# Patient Record
Sex: FEMALE | Race: BLACK OR AFRICAN AMERICAN | NOT HISPANIC OR LATINO | URBAN - METROPOLITAN AREA
[De-identification: names, ages, dates, MRNs, and addresses within clinical notes are randomized per-mention and may not be internally consistent; named-entity substitution may affect disease eponyms.]

---

## 2017-03-19 ENCOUNTER — EMERGENCY (EMERGENCY)
Facility: HOSPITAL | Age: 52
LOS: 0 days | Discharge: HOME | End: 2017-03-19

## 2017-06-27 DIAGNOSIS — R19.7 DIARRHEA, UNSPECIFIED: ICD-10-CM

## 2017-06-27 DIAGNOSIS — E83.42 HYPOMAGNESEMIA: ICD-10-CM

## 2017-06-27 DIAGNOSIS — R11.10 VOMITING, UNSPECIFIED: ICD-10-CM

## 2017-06-27 DIAGNOSIS — E87.6 HYPOKALEMIA: ICD-10-CM

## 2017-06-27 DIAGNOSIS — R51 HEADACHE: ICD-10-CM

## 2017-06-27 DIAGNOSIS — E86.0 DEHYDRATION: ICD-10-CM

## 2018-09-22 ENCOUNTER — OUTPATIENT (OUTPATIENT)
Dept: OUTPATIENT SERVICES | Facility: HOSPITAL | Age: 53
LOS: 1 days | Discharge: HOME | End: 2018-09-22

## 2018-09-22 DIAGNOSIS — D64.9 ANEMIA, UNSPECIFIED: ICD-10-CM

## 2018-09-22 DIAGNOSIS — E78.2 MIXED HYPERLIPIDEMIA: ICD-10-CM

## 2018-09-22 DIAGNOSIS — R79.9 ABNORMAL FINDING OF BLOOD CHEMISTRY, UNSPECIFIED: ICD-10-CM

## 2018-09-22 DIAGNOSIS — E03.9 HYPOTHYROIDISM, UNSPECIFIED: ICD-10-CM

## 2018-09-22 DIAGNOSIS — E11.9 TYPE 2 DIABETES MELLITUS WITHOUT COMPLICATIONS: ICD-10-CM

## 2021-07-06 ENCOUNTER — INPATIENT (INPATIENT)
Facility: HOSPITAL | Age: 56
LOS: 1 days | Discharge: HOME | End: 2021-07-08
Attending: INTERNAL MEDICINE | Admitting: INTERNAL MEDICINE
Payer: COMMERCIAL

## 2021-07-06 VITALS
TEMPERATURE: 98 F | OXYGEN SATURATION: 100 % | HEIGHT: 65 IN | SYSTOLIC BLOOD PRESSURE: 109 MMHG | RESPIRATION RATE: 18 BRPM | WEIGHT: 145.06 LBS | DIASTOLIC BLOOD PRESSURE: 59 MMHG | HEART RATE: 88 BPM

## 2021-07-06 LAB
ALBUMIN SERPL ELPH-MCNC: 4 G/DL — SIGNIFICANT CHANGE UP (ref 3.5–5.2)
ALBUMIN SERPL ELPH-MCNC: 4.4 G/DL — SIGNIFICANT CHANGE UP (ref 3.5–5.2)
ALP SERPL-CCNC: 83 U/L — SIGNIFICANT CHANGE UP (ref 30–115)
ALP SERPL-CCNC: 95 U/L — SIGNIFICANT CHANGE UP (ref 30–115)
ALT FLD-CCNC: 17 U/L — SIGNIFICANT CHANGE UP (ref 0–41)
ALT FLD-CCNC: 18 U/L — SIGNIFICANT CHANGE UP (ref 0–41)
ANION GAP SERPL CALC-SCNC: 13 MMOL/L — SIGNIFICANT CHANGE UP (ref 7–14)
ANION GAP SERPL CALC-SCNC: 8 MMOL/L — SIGNIFICANT CHANGE UP (ref 7–14)
ANION GAP SERPL CALC-SCNC: 9 MMOL/L — SIGNIFICANT CHANGE UP (ref 7–14)
APTT BLD: 24.1 SEC — LOW (ref 27–39.2)
APTT BLD: 38.2 SEC — SIGNIFICANT CHANGE UP (ref 27–39.2)
AST SERPL-CCNC: 19 U/L — SIGNIFICANT CHANGE UP (ref 0–41)
AST SERPL-CCNC: 41 U/L — SIGNIFICANT CHANGE UP (ref 0–41)
BASOPHILS # BLD AUTO: 0.04 K/UL — SIGNIFICANT CHANGE UP (ref 0–0.2)
BASOPHILS # BLD AUTO: 0.06 K/UL — SIGNIFICANT CHANGE UP (ref 0–0.2)
BASOPHILS NFR BLD AUTO: 0.4 % — SIGNIFICANT CHANGE UP (ref 0–1)
BASOPHILS NFR BLD AUTO: 0.5 % — SIGNIFICANT CHANGE UP (ref 0–1)
BILIRUB SERPL-MCNC: 0.2 MG/DL — SIGNIFICANT CHANGE UP (ref 0.2–1.2)
BILIRUB SERPL-MCNC: 0.2 MG/DL — SIGNIFICANT CHANGE UP (ref 0.2–1.2)
BLD GP AB SCN SERPL QL: SIGNIFICANT CHANGE UP
BUN SERPL-MCNC: 11 MG/DL — SIGNIFICANT CHANGE UP (ref 10–20)
BUN SERPL-MCNC: 13 MG/DL — SIGNIFICANT CHANGE UP (ref 10–20)
BUN SERPL-MCNC: 16 MG/DL — SIGNIFICANT CHANGE UP (ref 10–20)
CALCIUM SERPL-MCNC: 8.5 MG/DL — SIGNIFICANT CHANGE UP (ref 8.5–10.1)
CALCIUM SERPL-MCNC: 8.5 MG/DL — SIGNIFICANT CHANGE UP (ref 8.5–10.1)
CALCIUM SERPL-MCNC: 9.2 MG/DL — SIGNIFICANT CHANGE UP (ref 8.5–10.1)
CHLORIDE SERPL-SCNC: 103 MMOL/L — SIGNIFICANT CHANGE UP (ref 98–110)
CHLORIDE SERPL-SCNC: 106 MMOL/L — SIGNIFICANT CHANGE UP (ref 98–110)
CHLORIDE SERPL-SCNC: 96 MMOL/L — LOW (ref 98–110)
CK MB CFR SERPL CALC: 44.1 NG/ML — HIGH (ref 0.6–6.3)
CK SERPL-CCNC: 562 U/L — HIGH (ref 0–225)
CO2 SERPL-SCNC: 29 MMOL/L — SIGNIFICANT CHANGE UP (ref 17–32)
CO2 SERPL-SCNC: 31 MMOL/L — SIGNIFICANT CHANGE UP (ref 17–32)
CO2 SERPL-SCNC: 31 MMOL/L — SIGNIFICANT CHANGE UP (ref 17–32)
CREAT SERPL-MCNC: 0.5 MG/DL — LOW (ref 0.7–1.5)
CREAT SERPL-MCNC: 0.5 MG/DL — LOW (ref 0.7–1.5)
CREAT SERPL-MCNC: 0.6 MG/DL — LOW (ref 0.7–1.5)
EOSINOPHIL # BLD AUTO: 0 K/UL — SIGNIFICANT CHANGE UP (ref 0–0.7)
EOSINOPHIL # BLD AUTO: 0 K/UL — SIGNIFICANT CHANGE UP (ref 0–0.7)
EOSINOPHIL NFR BLD AUTO: 0 % — SIGNIFICANT CHANGE UP (ref 0–8)
EOSINOPHIL NFR BLD AUTO: 0 % — SIGNIFICANT CHANGE UP (ref 0–8)
GLUCOSE SERPL-MCNC: 116 MG/DL — HIGH (ref 70–99)
GLUCOSE SERPL-MCNC: 132 MG/DL — HIGH (ref 70–99)
GLUCOSE SERPL-MCNC: 92 MG/DL — SIGNIFICANT CHANGE UP (ref 70–99)
HCG SERPL QL: NEGATIVE — SIGNIFICANT CHANGE UP
HCT VFR BLD CALC: 36.3 % — LOW (ref 37–47)
HCT VFR BLD CALC: 38.3 % — SIGNIFICANT CHANGE UP (ref 37–47)
HGB BLD-MCNC: 12 G/DL — SIGNIFICANT CHANGE UP (ref 12–16)
HGB BLD-MCNC: 12.7 G/DL — SIGNIFICANT CHANGE UP (ref 12–16)
IMM GRANULOCYTES NFR BLD AUTO: 0.2 % — SIGNIFICANT CHANGE UP (ref 0.1–0.3)
IMM GRANULOCYTES NFR BLD AUTO: 0.3 % — SIGNIFICANT CHANGE UP (ref 0.1–0.3)
INR BLD: 1.06 RATIO — SIGNIFICANT CHANGE UP (ref 0.65–1.3)
INR BLD: 1.09 RATIO — SIGNIFICANT CHANGE UP (ref 0.65–1.3)
LYMPHOCYTES # BLD AUTO: 1.57 K/UL — SIGNIFICANT CHANGE UP (ref 1.2–3.4)
LYMPHOCYTES # BLD AUTO: 16.3 % — LOW (ref 20.5–51.1)
LYMPHOCYTES # BLD AUTO: 18.1 % — LOW (ref 20.5–51.1)
LYMPHOCYTES # BLD AUTO: 2.06 K/UL — SIGNIFICANT CHANGE UP (ref 1.2–3.4)
MAGNESIUM SERPL-MCNC: 1.7 MG/DL — LOW (ref 1.8–2.4)
MAGNESIUM SERPL-MCNC: 2 MG/DL — SIGNIFICANT CHANGE UP (ref 1.8–2.4)
MAGNESIUM SERPL-MCNC: 2.2 MG/DL — SIGNIFICANT CHANGE UP (ref 1.8–2.4)
MCHC RBC-ENTMCNC: 27.7 PG — SIGNIFICANT CHANGE UP (ref 27–31)
MCHC RBC-ENTMCNC: 27.9 PG — SIGNIFICANT CHANGE UP (ref 27–31)
MCHC RBC-ENTMCNC: 33.1 G/DL — SIGNIFICANT CHANGE UP (ref 32–37)
MCHC RBC-ENTMCNC: 33.2 G/DL — SIGNIFICANT CHANGE UP (ref 32–37)
MCV RBC AUTO: 83.8 FL — SIGNIFICANT CHANGE UP (ref 81–99)
MCV RBC AUTO: 84 FL — SIGNIFICANT CHANGE UP (ref 81–99)
MONOCYTES # BLD AUTO: 0.47 K/UL — SIGNIFICANT CHANGE UP (ref 0.1–0.6)
MONOCYTES # BLD AUTO: 0.73 K/UL — HIGH (ref 0.1–0.6)
MONOCYTES NFR BLD AUTO: 4.9 % — SIGNIFICANT CHANGE UP (ref 1.7–9.3)
MONOCYTES NFR BLD AUTO: 6.4 % — SIGNIFICANT CHANGE UP (ref 1.7–9.3)
NEUTROPHILS # BLD AUTO: 7.51 K/UL — HIGH (ref 1.4–6.5)
NEUTROPHILS # BLD AUTO: 8.48 K/UL — HIGH (ref 1.4–6.5)
NEUTROPHILS NFR BLD AUTO: 74.8 % — SIGNIFICANT CHANGE UP (ref 42.2–75.2)
NEUTROPHILS NFR BLD AUTO: 78.1 % — HIGH (ref 42.2–75.2)
NRBC # BLD: 0 /100 WBCS — SIGNIFICANT CHANGE UP (ref 0–0)
NRBC # BLD: 0 /100 WBCS — SIGNIFICANT CHANGE UP (ref 0–0)
PHOSPHATE SERPL-MCNC: 3.2 MG/DL — SIGNIFICANT CHANGE UP (ref 2.1–4.9)
PLATELET # BLD AUTO: 237 K/UL — SIGNIFICANT CHANGE UP (ref 130–400)
PLATELET # BLD AUTO: 265 K/UL — SIGNIFICANT CHANGE UP (ref 130–400)
POTASSIUM SERPL-MCNC: 2.4 MMOL/L — CRITICAL LOW (ref 3.5–5)
POTASSIUM SERPL-MCNC: 3 MMOL/L — LOW (ref 3.5–5)
POTASSIUM SERPL-MCNC: 4.2 MMOL/L — SIGNIFICANT CHANGE UP (ref 3.5–5)
POTASSIUM SERPL-SCNC: 2.4 MMOL/L — CRITICAL LOW (ref 3.5–5)
POTASSIUM SERPL-SCNC: 3 MMOL/L — LOW (ref 3.5–5)
POTASSIUM SERPL-SCNC: 4.2 MMOL/L — SIGNIFICANT CHANGE UP (ref 3.5–5)
PROT SERPL-MCNC: 6.6 G/DL — SIGNIFICANT CHANGE UP (ref 6–8)
PROT SERPL-MCNC: 7.4 G/DL — SIGNIFICANT CHANGE UP (ref 6–8)
PROTHROM AB SERPL-ACNC: 12.2 SEC — SIGNIFICANT CHANGE UP (ref 9.95–12.87)
PROTHROM AB SERPL-ACNC: 12.5 SEC — SIGNIFICANT CHANGE UP (ref 9.95–12.87)
RBC # BLD: 4.33 M/UL — SIGNIFICANT CHANGE UP (ref 4.2–5.4)
RBC # BLD: 4.56 M/UL — SIGNIFICANT CHANGE UP (ref 4.2–5.4)
RBC # FLD: 14.4 % — SIGNIFICANT CHANGE UP (ref 11.5–14.5)
RBC # FLD: 14.6 % — HIGH (ref 11.5–14.5)
SARS-COV-2 RNA SPEC QL NAA+PROBE: SIGNIFICANT CHANGE UP
SODIUM SERPL-SCNC: 140 MMOL/L — SIGNIFICANT CHANGE UP (ref 135–146)
SODIUM SERPL-SCNC: 143 MMOL/L — SIGNIFICANT CHANGE UP (ref 135–146)
SODIUM SERPL-SCNC: 143 MMOL/L — SIGNIFICANT CHANGE UP (ref 135–146)
TROPONIN T SERPL-MCNC: 0.32 NG/ML — CRITICAL HIGH
TROPONIN T SERPL-MCNC: <0.01 NG/ML — SIGNIFICANT CHANGE UP
WBC # BLD: 11.35 K/UL — HIGH (ref 4.8–10.8)
WBC # BLD: 9.62 K/UL — SIGNIFICANT CHANGE UP (ref 4.8–10.8)
WBC # FLD AUTO: 11.35 K/UL — HIGH (ref 4.8–10.8)
WBC # FLD AUTO: 9.62 K/UL — SIGNIFICANT CHANGE UP (ref 4.8–10.8)

## 2021-07-06 PROCEDURE — 71045 X-RAY EXAM CHEST 1 VIEW: CPT | Mod: 26

## 2021-07-06 PROCEDURE — 99291 CRITICAL CARE FIRST HOUR: CPT

## 2021-07-06 PROCEDURE — 93306 TTE W/DOPPLER COMPLETE: CPT | Mod: 26

## 2021-07-06 PROCEDURE — 99233 SBSQ HOSP IP/OBS HIGH 50: CPT

## 2021-07-06 PROCEDURE — 93010 ELECTROCARDIOGRAM REPORT: CPT | Mod: 76

## 2021-07-06 RX ORDER — SODIUM CHLORIDE 9 MG/ML
1000 INJECTION INTRAMUSCULAR; INTRAVENOUS; SUBCUTANEOUS
Refills: 0 | Status: DISCONTINUED | OUTPATIENT
Start: 2021-07-06 | End: 2021-07-08

## 2021-07-06 RX ORDER — METOPROLOL TARTRATE 50 MG
12.5 TABLET ORAL EVERY 12 HOURS
Refills: 0 | Status: DISCONTINUED | OUTPATIENT
Start: 2021-07-06 | End: 2021-07-07

## 2021-07-06 RX ORDER — SODIUM CHLORIDE 9 MG/ML
1000 INJECTION INTRAMUSCULAR; INTRAVENOUS; SUBCUTANEOUS ONCE
Refills: 0 | Status: COMPLETED | OUTPATIENT
Start: 2021-07-06 | End: 2021-07-06

## 2021-07-06 RX ORDER — CLOPIDOGREL BISULFATE 75 MG/1
600 TABLET, FILM COATED ORAL ONCE
Refills: 0 | Status: COMPLETED | OUTPATIENT
Start: 2021-07-06 | End: 2021-07-06

## 2021-07-06 RX ORDER — HEPARIN SODIUM 5000 [USP'U]/ML
4100 INJECTION INTRAVENOUS; SUBCUTANEOUS ONCE
Refills: 0 | Status: DISCONTINUED | OUTPATIENT
Start: 2021-07-06 | End: 2021-07-06

## 2021-07-06 RX ORDER — PANTOPRAZOLE SODIUM 20 MG/1
40 TABLET, DELAYED RELEASE ORAL
Refills: 0 | Status: DISCONTINUED | OUTPATIENT
Start: 2021-07-06 | End: 2021-07-08

## 2021-07-06 RX ORDER — POTASSIUM CHLORIDE 20 MEQ
20 PACKET (EA) ORAL
Refills: 0 | Status: COMPLETED | OUTPATIENT
Start: 2021-07-06 | End: 2021-07-06

## 2021-07-06 RX ORDER — MAGNESIUM SULFATE 500 MG/ML
2 VIAL (ML) INJECTION ONCE
Refills: 0 | Status: COMPLETED | OUTPATIENT
Start: 2021-07-06 | End: 2021-07-06

## 2021-07-06 RX ORDER — HEPARIN SODIUM 5000 [USP'U]/ML
INJECTION INTRAVENOUS; SUBCUTANEOUS
Qty: 25000 | Refills: 0 | Status: DISCONTINUED | OUTPATIENT
Start: 2021-07-06 | End: 2021-07-06

## 2021-07-06 RX ORDER — CLOPIDOGREL BISULFATE 75 MG/1
75 TABLET, FILM COATED ORAL DAILY
Refills: 0 | Status: DISCONTINUED | OUTPATIENT
Start: 2021-07-06 | End: 2021-07-08

## 2021-07-06 RX ORDER — ONDANSETRON 8 MG/1
4 TABLET, FILM COATED ORAL ONCE
Refills: 0 | Status: COMPLETED | OUTPATIENT
Start: 2021-07-06 | End: 2021-07-06

## 2021-07-06 RX ORDER — ASPIRIN/CALCIUM CARB/MAGNESIUM 324 MG
324 TABLET ORAL ONCE
Refills: 0 | Status: COMPLETED | OUTPATIENT
Start: 2021-07-06 | End: 2021-07-06

## 2021-07-06 RX ORDER — POTASSIUM CHLORIDE 20 MEQ
40 PACKET (EA) ORAL EVERY 4 HOURS
Refills: 0 | Status: COMPLETED | OUTPATIENT
Start: 2021-07-06 | End: 2021-07-06

## 2021-07-06 RX ORDER — POTASSIUM CHLORIDE 20 MEQ
40 PACKET (EA) ORAL ONCE
Refills: 0 | Status: COMPLETED | OUTPATIENT
Start: 2021-07-06 | End: 2021-07-06

## 2021-07-06 RX ORDER — ASPIRIN/CALCIUM CARB/MAGNESIUM 324 MG
81 TABLET ORAL DAILY
Refills: 0 | Status: DISCONTINUED | OUTPATIENT
Start: 2021-07-06 | End: 2021-07-08

## 2021-07-06 RX ORDER — ATORVASTATIN CALCIUM 80 MG/1
80 TABLET, FILM COATED ORAL AT BEDTIME
Refills: 0 | Status: DISCONTINUED | OUTPATIENT
Start: 2021-07-06 | End: 2021-07-08

## 2021-07-06 RX ADMIN — Medication 324 MILLIGRAM(S): at 02:34

## 2021-07-06 RX ADMIN — Medication 81 MILLIGRAM(S): at 14:31

## 2021-07-06 RX ADMIN — Medication 50 GRAM(S): at 02:33

## 2021-07-06 RX ADMIN — SODIUM CHLORIDE 100 MILLILITER(S): 9 INJECTION INTRAMUSCULAR; INTRAVENOUS; SUBCUTANEOUS at 13:55

## 2021-07-06 RX ADMIN — CLOPIDOGREL BISULFATE 75 MILLIGRAM(S): 75 TABLET, FILM COATED ORAL at 14:31

## 2021-07-06 RX ADMIN — ONDANSETRON 4 MILLIGRAM(S): 8 TABLET, FILM COATED ORAL at 16:15

## 2021-07-06 RX ADMIN — HEPARIN SODIUM 800 UNIT(S)/HR: 5000 INJECTION INTRAVENOUS; SUBCUTANEOUS at 02:34

## 2021-07-06 RX ADMIN — Medication 40 MILLIEQUIVALENT(S): at 17:51

## 2021-07-06 RX ADMIN — SODIUM CHLORIDE 1000 MILLILITER(S): 9 INJECTION INTRAMUSCULAR; INTRAVENOUS; SUBCUTANEOUS at 02:32

## 2021-07-06 RX ADMIN — Medication 40 MILLIEQUIVALENT(S): at 02:34

## 2021-07-06 RX ADMIN — Medication 40 MILLIEQUIVALENT(S): at 05:51

## 2021-07-06 RX ADMIN — ATORVASTATIN CALCIUM 80 MILLIGRAM(S): 80 TABLET, FILM COATED ORAL at 21:56

## 2021-07-06 RX ADMIN — Medication 12.5 MILLIGRAM(S): at 17:51

## 2021-07-06 RX ADMIN — Medication 40 MILLIEQUIVALENT(S): at 14:30

## 2021-07-06 RX ADMIN — CLOPIDOGREL BISULFATE 600 MILLIGRAM(S): 75 TABLET, FILM COATED ORAL at 02:33

## 2021-07-06 NOTE — H&P ADULT - ASSESSMENT
IMPRESSION:  - Suspecting Inferior wall MI (no clear ST elevations on EKG; possible inferior wall hypokinesis on bedside echo)  - Hypokalemia (on chronic K supplementation at home)  - Hypomagnesemia     PLAN:    CNS: Avoid CNS Depressants     HEENT: Oral care. Aspiration precautions     PULMONARY: HOB @ 45. Monitor Pulse Ox. Keep > 93%. Supplement as needed.    CARDIOVASCULAR:   - Loaded in ASA and Plavix in the ED. ASA 81mg PO QD, Plavix 75mg PO QD from tomorrow. Lipitor 80mg PO QHS  - LHC in AM. Keep NPO  - Lipid profile. HbA1C. Echo  - Continue heparin gtt. Monitor PTT. Target PTT 55 - 88    GI: GI prophylaxis. NPO    RENAL: Avoid nephrotoxic agents. Monitor Cr. IV hydration. Received 20 mEq IV potassium and 40 mEq oral potassium in the ED. Follow repeat. Also received 2g Mg in the ED. Follow repeat in AM    INFECTIOUS DISEASE: Afebrile     HEMATOLOGICAL: Continue heparin gtt. Monitor PTT. Target PTT 55 - 88    ENDOCRINE: Monitor FS. Insulin protocol if needed. HbA1C in AM    MUSCULOSKELETAL: Bedrest     CODE STATUS: Full Code    DISPOSITION: Admit to CCU         57 YO F with PMHx of hypokalemia (on home supplementation), active smoker (1/2 PPD x 30 yrs), works as a  (denies any history of chest pain on exertion) presented to the ED with chest pain for 1 hour after she was under emotional stress     IMPRESSION:  - Suspected Inferior wall MI (no clear ST elevations on EKG; possible inferior wall hypokinesis on bedside echo)  - Active smoker (1/2 PPD x 30 years)  - Hypokalemia (on chronic K supplementation at home)  - Hypomagnesemia     PLAN:    CNS: Avoid CNS Depressants     HEENT: Oral care. Aspiration precautions     PULMONARY: HOB @ 45. Monitor Pulse Ox. Keep > 93%. Supplement as needed.    CARDIOVASCULAR:   - Loaded in ASA and Plavix in the ED. ASA 81mg PO QD, Plavix 75mg PO QD from tomorrow. Lipitor 80mg PO QHS  - LHC in AM. Keep NPO  - Lipid profile. HbA1C. Echo  - Continue heparin gtt. Monitor PTT. Target PTT 55 - 88    GI: GI prophylaxis. NPO    RENAL: Avoid nephrotoxic agents. Monitor Cr. IV hydration. Received 20 mEq IV potassium and 40 mEq oral potassium in the ED. Follow repeat. Also received 2g Mg in the ED. Follow repeat in AM    INFECTIOUS DISEASE: Afebrile     HEMATOLOGICAL: Continue heparin gtt. Monitor PTT. Target PTT 55 - 88    ENDOCRINE: Monitor FS. Insulin protocol if needed. HbA1C in AM    MUSCULOSKELETAL: Bedrest     CODE STATUS: Full Code    DISPOSITION: Admit to CCU         57 YO F with PMHx of hypokalemia (on home supplementation), active smoker (1/2 PPD x 30 yrs), works as a  (denies any history of chest pain on exertion) presented to the ED with chest pain for 1 hour after she was under emotional stress     IMPRESSION:  - Suspected Inferior wall MI (no clear ST elevations on EKG; possible inferior wall hypokinesis on bedside echo)  - Active smoker (1/2 PPD x 30 years)  - Hypokalemia (on chronic K supplementation at home)  - Hypomagnesemia     PLAN:    CNS: Avoid CNS Depressants     HEENT: Oral care. Aspiration precautions     PULMONARY: HOB @ 45. Monitor Pulse Ox. Keep > 93%. Supplement as needed.    CARDIOVASCULAR:   - Loaded in ASA and Plavix in the ED. ASA 81mg PO QD, Plavix 75mg PO QD from tomorrow. Lipitor 80mg PO QHS  - LHC in AM. Keep NPO  (currently on schedule,  however pt expressing doubts re: wanting any invasive procedures -  pending discussion with interventional cardiologist before she's willing to commit to final decision regarding cardiac cath)  - Lipid profile. HbA1C.   - Echo  - Continue heparin gtt. Monitor PTT. Target PTT 55 - 88        GI: GI prophylaxis. NPO    RENAL: Avoid nephrotoxic agents. Monitor Cr. IV hydration. Received 20 mEq IV potassium and 40 mEq oral potassium in the ED. Follow repeat. Also received 2g Mg in the ED. Follow repeat in AM    INFECTIOUS DISEASE: Afebrile     HEMATOLOGICAL: Continue heparin gtt. Monitor PTT. Target PTT 55 - 88    ENDOCRINE: Monitor FS. Insulin protocol if needed. HbA1C in AM    MUSCULOSKELETAL: Bedrest     CODE STATUS: Full Code    DISPOSITION: Admit to CCU         55 YO F with PMHx of hypokalemia (on home supplementation), active smoker (1/2 PPD x 30 yrs), works as a  (denies any history of chest pain on exertion) presented to the ED with chest pain for 1 hour after she was under emotional stress     IMPRESSION:  - Suspected Inferior wall MI (no clear ST elevations on EKG; possible inferior wall hypokinesis on bedside echo) first ekg had st elevations subsequet was (-) had cath later   - Active smoker (1/2 PPD x 30 years)  - Hypokalemia (on chronic K supplementation at home)  - Hypomagnesemia     PLAN:    CNS: Avoid CNS Depressants     HEENT: Oral care. Aspiration precautions     PULMONARY: HOB @ 45. Monitor Pulse Ox. Keep > 93%. Supplement as needed.    CARDIOVASCULAR:   - Loaded in ASA and Plavix in the ED. ASA 81mg PO QD, Plavix 75mg PO QD from tomorrow. Lipitor 80mg PO QHS  - LHC in AM. Keep NPO  (currently on schedule,  however pt expressing doubts re: wanting any invasive procedures -  pending discussion with interventional cardiologist before she's willing to commit to final decision regarding cardiac cath)  - Lipid profile. HbA1C.   - Echo  - Continue heparin gtt. Monitor PTT. Target PTT 55 - 88        GI: GI prophylaxis. NPO    RENAL: Avoid nephrotoxic agents. Monitor Cr. IV hydration. Received 20 mEq IV potassium and 40 mEq oral potassium in the ED. Follow repeat. Also received 2g Mg in the ED. Follow repeat in AM    INFECTIOUS DISEASE: Afebrile     HEMATOLOGICAL: Continue heparin gtt. Monitor PTT. Target PTT 55 - 88    ENDOCRINE: Monitor FS. Insulin protocol if needed. HbA1C in AM    MUSCULOSKELETAL: Bedrest     CODE STATUS: Full Code    DISPOSITION: Admit to CCU

## 2021-07-06 NOTE — ED PROVIDER NOTE - PHYSICAL EXAMINATION
VITAL SIGNS: I have reviewed nursing notes and confirm.  CONSTITUTIONAL: Well-developed; well-nourished; in mild distress.  SKIN: Skin exam is warm and dry, no acute rash.  HEAD: Normocephalic; atraumatic.  EYES: Conjunctiva and sclera clear.  ENT: No nasal discharge; airway clear.   CARD: S1, S2 normal; no murmurs, gallops, or rubs. Regular rate and rhythm.  RESP: No wheezes, rales or rhonchi. Speaking in full sentences.   ABD: Normal bowel sounds; soft; non-distended; non-tender; No rebound or guarding. No CVA tenderness.  EXT: Normal ROM. No clubbing, cyanosis or edema. No calf TTP or swelling.   NEURO: Alert, oriented. Grossly unremarkable. No focal deficits.

## 2021-07-06 NOTE — H&P ADULT - NSHPLABSRESULTS_GEN_ALL_CORE
VITAL SIGNS: Last 24 Hours  T(C): 36.6 (06 Jul 2021 00:19), Max: 36.6 (06 Jul 2021 00:19)  T(F): 97.8 (06 Jul 2021 00:19), Max: 97.8 (06 Jul 2021 00:19)  HR: 88 (06 Jul 2021 00:19) (88 - 88)  BP: 109/59 (06 Jul 2021 00:19) (109/59 - 109/59)  BP(mean): --  RR: 18 (06 Jul 2021 00:19) (18 - 18)  SpO2: 100% (06 Jul 2021 00:19) (100% - 100%)    LABS:                        12.7   11.35 )-----------( 265      ( 06 Jul 2021 00:39 )             38.3     07-06    140  |  96<L>  |  16  ----------------------------<  132<H>  2.4<LL>   |  31  |  0.6<L>    Ca    9.2      06 Jul 2021 00:39  Mg     1.7     07-06    TPro  7.4  /  Alb  4.4  /  TBili  0.2  /  DBili  x   /  AST  19  /  ALT  17  /  AlkPhos  95  07-06    PT/INR - ( 06 Jul 2021 00:39 )   PT: 12.20 sec;   INR: 1.06 ratio         PTT - ( 06 Jul 2021 00:39 )  PTT:24.1 sec      Troponin T, Serum: <0.01 ng/mL (07-06-21 @ 00:39)      CARDIAC MARKERS ( 06 Jul 2021 00:39 )  x     / <0.01 ng/mL / x     / x     / x          RADIOLOGY:

## 2021-07-06 NOTE — CONSULT NOTE ADULT - SUBJECTIVE AND OBJECTIVE BOX
Outpt cardiologist:  none    HPI:  55 yo F  pmh hypoK (on home supplementation), active smoker,  active  (without any c/o recent chest pains on exertion) --  pw chest pain 1h ptp during emotional stress.    retrosternal, radiating toward neck / L arm.   a/w diaphoresis, mild nausea  symptoms resolving in ED.     Initial EKG showing rounded TW inferior leads questionable for CHU with I, avl, v1-v2 twi.  however no clear CHU.  On repeat EKG, improvement of the inferior TW abnormalities, but with anterolat std.      code stemi cancelled and pt added on for early cardiac catheterization tomorrow am.        Of note:  Pt did express hesitancy re: cardiac cath (fear of pain;  had painful iv insertion in ed) -- however ultimately agreeable with support and encouragement of family members at bedside.         PAST MEDICAL & SURGICAL HISTORY      FAMILY HISTORY:  FAMILY HISTORY:      SOCIAL HISTORY:  Social History:  active smoker      ALLERGIES:  No Known Allergies      MEDICATIONS:  aspirin  chewable 324 milliGRAM(s) Oral once  clopidogrel Tablet 600 milliGRAM(s) Oral Once  heparin  Infusion.  Unit(s)/Hr (8 mL/Hr) IV Continuous <Continuous>  magnesium sulfate  IVPB 2 Gram(s) IV Intermittent Once  potassium chloride    Tablet ER 40 milliEquivalent(s) Oral once  potassium chloride  20 mEq/100 mL IVPB 20 milliEquivalent(s) IV Intermittent every 2 hours  sodium chloride 0.9% Bolus 1000 milliLiter(s) IV Bolus once    PRN:      HOME MEDICATIONS:  Home Medications:      VITALS:   T(F): 97.8 (07-06 @ 00:19), Max: 97.8 (07-06 @ 00:19)  HR: 88 (07-06 @ 00:19) (88 - 88)  BP: 109/59 (07-06 @ 00:19) (109/59 - 109/59)  BP(mean): --  RR: 18 (07-06 @ 00:19) (18 - 18)  SpO2: 100% (07-06 @ 00:19) (100% - 100%)    I&O's Summary      REVIEW OF SYSTEMS:  CONSTITUTIONAL: No weakness, fevers or chills  HEENT: No visual changes, neck/ear pain  RESPIRATORY: No cough, sob  CARDIOVASCULAR: See HPI  GASTROINTESTINAL: No abdominal pain. No nausea, vomiting, diarrhea   GENITOURINARY: No dysuria, frequency or hematuria  NEUROLOGICAL: No new focal deficits  SKIN: No new rashes    PHYSICAL EXAM:  General: Not in distress.  Non-toxic appearing.   HEENT: EOMI  Cardio: regular, S1, S2, no murmur  Pulm: B/L BS.  No wheezing / crackles / rales  Abdomen: Soft, non-tender, non-distended. Normoactive bowel sounds  Extremities: No edema b/l le  Neuro: A&O x3. No focal deficits    LABS:                        12.7   11.35 )-----------( 265      ( 06 Jul 2021 00:39 )             38.3     07-06    140  |  96<L>  |  16  ----------------------------<  132<H>  2.4<LL>   |  31  |  0.6<L>    Ca    9.2      06 Jul 2021 00:39  Mg     1.7     07-06    TPro  7.4  /  Alb  4.4  /  TBili  0.2  /  DBili  x   /  AST  19  /  ALT  17  /  AlkPhos  95  07-06    PT/INR - ( 06 Jul 2021 00:39 )   PT: 12.20 sec;   INR: 1.06 ratio         PTT - ( 06 Jul 2021 00:39 )  PTT:24.1 sec  Troponin T, Serum: <0.01 ng/mL (07-06-21 @ 00:39)    CARDIAC MARKERS ( 06 Jul 2021 00:39 )  x     / <0.01 ng/mL / x     / x     / x            Troponin trend:

## 2021-07-06 NOTE — ED PROVIDER NOTE - OBJECTIVE STATEMENT
55 yo F with PMHx of hypokalemia presents to the ED c/o moderate substernal chest pain that started while she was stressed. Pain is pressure like, radiates towards the neck and left arm. Pt admits to associated nausea, SOB and diaphoresis. Pt denies hx of similar pain in the past. Since onset pain has slightly improved. She is active smoker. She denies family hx of CAD. Pt denies fever, chills, vomiting, abdominal pain, diarrhea, headache, dizziness, weakness, back pain, LOC, trauma, urinary symptoms, cough, calf pain/swelling, recent travel, recent surgery.

## 2021-07-06 NOTE — ED PROVIDER NOTE - NS ED ROS FT
Review of Systems  Constitutional:  No fever, chills, malaise, generalized weakness.  Eyes:  No visual changes, eye pain, or discharge.  ENMT:  No hearing changes, pain, or discharge. No nasal congestion, discharge, or bleeding. No throat pain, swelling, or difficulty swallowing.  Cardiac:  No palpitations, syncope, or edema. (+) chest pain  Respiratory:  No cough. No hemoptysis. (+) SOB  GI:  No nausea, vomiting, diarrhea, or abdominal pain.   :  No dysuria, hematuria, frequency, or burning.   MS:  No back pain.  Skin:  No skin rash, pruritis, jaundice, or lesions.  Neuro:  No headache, dizziness, loss of sensation, or focal weakness.  No change in mental status.

## 2021-07-06 NOTE — CONSULT NOTE ADULT - ASSESSMENT
IMPRESSION  - Suspecting IW MI (no clear CHU on ekg; possible iw hypok on bedside echo)  - Hypokalemia (on chronic K supplementation at home)  - Hypomagnesemia       PLAN  - added on for LHC in am  - keep npo  - check a1c, lipid panel  - check tte  - replete K, replete Mg  - IVF for HoTN (however pt states she is normally hypotensive at baseline -- though unable to recall baseline numbers at this time)  - if recurrence of chest pains overnight -- notify card fellow and repeat stat EKG with trop/ckmb.  IMPRESSION  - Suspecting IW MI (no clear CHU on ekg; possible iw hypok on bedside echo)  - Hypokalemia (on chronic K supplementation at home)  - Hypomagnesemia       PLAN  - added on for LHC in am  - keep npo  - check a1c, lipid panel  - check tte  - replete K, replete Mg  - IVF for HoTN (however pt states she is normally hypotensive at baseline -- though unable to recall baseline numbers at this time)  - sp asa / plavix loads + hep gtt started in ed  - cw asa 81 / plavix 75 in am  - start lipitor 80  - will hold off on bb given borderline bp  - cw hep gtt and repeat PTT 4:30am.   - if recurrence of chest pains overnight -- notify card fellow and repeat stat EKG with trop/ckmb.

## 2021-07-06 NOTE — H&P ADULT - NSHPREVIEWOFSYSTEMS_GEN_ALL_CORE
CONSTITUTIONAL: No weakness, fevers or chills; No headaches  EYES: No visual changes, eye pain, or discharge  ENT: No vertigo; No ear pain or change in hearing; No sore throat or difficulty swallowing  NECK: No pain or stiffness  RESPIRATORY: No cough, wheezing, or hemoptysis; No shortness of breath  CARDIOVASCULAR: No chest pain or palpitations  GASTROINTESTINAL: No abdominal or epigastric pain; No nausea, vomiting, or hematemesis; No diarrhea or constipation; No melena or hematochezia  GENITOURINARY: No dysuria, frequency or hematuria  MUSCULOSKELETAL: No joint pain, no muscle pain, no weakness  NEUROLOGICAL: No numbness or weakness  SKIN: No itching or rashes CONSTITUTIONAL: No weakness, fevers or chills; No headaches  EYES: No visual changes, eye pain, or discharge  ENT: No vertigo; No ear pain or change in hearing; No sore throat or difficulty swallowing  NECK: No pain or stiffness  RESPIRATORY: No cough, wheezing, or hemoptysis; No shortness of breath  CARDIOVASCULAR: Chest pain +ve   GASTROINTESTINAL: No abdominal or epigastric pain; No nausea, vomiting, or hematemesis; No diarrhea or constipation; No melena or hematochezia  GENITOURINARY: No dysuria, frequency or hematuria  MUSCULOSKELETAL: No joint pain, no muscle pain, no weakness  NEUROLOGICAL: No numbness or weakness  SKIN: No itching or rashes

## 2021-07-06 NOTE — ED PROVIDER NOTE - ATTENDING CONTRIBUTION TO CARE
57 yo F with PMH of hypokalemia presents to Ed for CP that started one hour prior to arrival. Pt states the pain radiates to her L arm and up her neck. She has never had this in the past. No SOB, palpitations, nausea, vomiting, abdominal pain, diarrhea.   She has never seen a cardiologist.   + cigarette     Const: Well nourished, well developed, appears stated age  Eyes: PERRL, no conjunctival injection  HENT:  Neck supple without meningismus   CV: RRR, Warm, well-perfused extremities  RESP: CTA B/L, no tachypnea   GI: soft, non-tender, non-distended  MSK: No gross deformities appreciated  Skin: Warm, dry. No rashes  Neuro: Alert, CNs II-XII grossly intact. Sensation and motor function of extremities grossly intact.  Psych: Appropriate mood and affect.    EKG in triage concerning for STEMI. Stemi activation. 55 yo F with PMH of hypokalemia presents to Ed for CP that started one hour prior to arrival. Pt states the pain radiates to her L arm and up her neck. She has never had this in the past. No SOB, palpitations, nausea, vomiting, abdominal pain, diarrhea.   She has never seen a cardiologist.   + cigarette     Const: Well nourished, well developed, appears stated age  Eyes: PERRL, no conjunctival injection  HENT:  Neck supple without meningismus   CV: RRR, Warm, well-perfused extremities  RESP: CTA B/L, no tachypnea   GI: soft, non-tender, non-distended  MSK: No gross deformities appreciated  Skin: Warm, dry. No rashes  Neuro: Alert, CNs II-XII grossly intact. Sensation and motor function of extremities grossly intact.  Psych: Appropriate mood and affect.    EKG concerning for STEMI- stemi activation   labs, CXR, EKG  EKG in triage concerning for STEMI. Stemi activation.

## 2021-07-06 NOTE — ED PROVIDER NOTE - CLINICAL SUMMARY MEDICAL DECISION MAKING FREE TEXT BOX
57 yo F presented to ED for CP. EKG concerning for STEMI and stemi code activated. Pt found to be hypokalemic and replaced in ED. Cardiology does not believe pt needs immediate cath but will put her on the schedule in the AM.   Pt given ASA and plavix and admitted for further evaluation and monitoring.

## 2021-07-06 NOTE — ED PROVIDER NOTE - CARE PLAN
Principal Discharge DX:	Chest pain  Secondary Diagnosis:	Hypokalemia  Secondary Diagnosis:	Hypomagnesemia

## 2021-07-06 NOTE — CONSULT NOTE ADULT - NSCONSULTADDITIONALINFOA_GEN_ALL_CORE
The patient was seen and examined in ED. The patient was still refusing the procedure. The risks and possible complications of her decision were fully discussed with her. She understands. I offered the patient to call her daughter and go over the procedure but the patient got upset with the idea of involving her daughter.  Will discuss  with the patient again and will speak to IC if she agrees.

## 2021-07-06 NOTE — CHART NOTE - NSCHARTNOTEFT_GEN_A_CORE
PRE-OP DIAGNOSIS:   NSTEMI    PROCEDURE:  [x] Coronary angiography  [x] LHC  [] RHC    Attending Physician: Dr. Mitchell  Attending Physician: Dr. Gaviria  Interventional Fellow: Dr. Yarbrough  Fellow: Dr. Morris    ANESTHESIA TYPE:  [] General Anesthesia  [x] Sedation  [x] Local/Regional    ESTIMATED BLOOD LOSS:    10   mL    CONDITION  [] Critical  [] Serious  [x] Fair  [] Good    IV CONTRAST:     75 mL    FINDINGS  Left Heart Catheterization:  LVEF%: 60  LVEDP: Mildly elevated  [] Normal Coronary Arteries  [] Luminal Irregularities  [] Non-obstructive CAD  [x] Significant 1V CAD    ACCESS:  [x] right radial artery  [] right femoral artery    LEFT HEART CATHETERIZATION  Left main: Mild disease  LAD: Mild disease  Diag: Mild disease  Left Circumflex: Minor disease  OM: Minor disease  Right Coronary Artery: 90% thrombotic lesion distal   RPDA: Mild disease  PLS: Mild disease    INTERVENTION  SPECIMEN REMOVED: Not applicable  IMPLANTS: HIGINIO to distal RCA    APPROPRIATE USE CRITERIA (AUC): 8    POST-OP DIAGNOSIS:  Significant 1V CAD; HIGINIO to distal RCA    PLAN OF CARE:  [] D/C Home today  [] D/C in AM  [x] Return to In-patient bed  [] Admit for observation  [] Return for staged procedure:  [] CT Surgery consult called  [x] Continue DAPT, B-blocker & Statin therapy PRE-OP DIAGNOSIS:   STEMI    PROCEDURE:  [x] Coronary angiography  [x] LHC  [] RHC    Attending Physician: Dr. Mitchell  Attending Physician: Dr. Gaviria  Interventional Fellow: Dr. Yarbrough  Fellow: Dr. Morris    ANESTHESIA TYPE:  [] General Anesthesia  [x] Sedation  [x] Local/Regional    ESTIMATED BLOOD LOSS:    10   mL    CONDITION  [] Critical  [] Serious  [x] Fair  [] Good    IV CONTRAST:     75 mL    FINDINGS  Left Heart Catheterization:  LVEF%: 60  LVEDP: Mildly elevated  [] Normal Coronary Arteries  [] Luminal Irregularities  [] Non-obstructive CAD  [x] Significant 1V CAD    ACCESS:  [x] right radial artery  [] right femoral artery    LEFT HEART CATHETERIZATION  Left main: Mild disease  LAD: Mild disease  Diag: Mild disease  Left Circumflex: Minor disease  OM: Minor disease  Right Coronary Artery: 90% thrombotic lesion distal   RPDA: Mild disease  PLS: Mild disease    INTERVENTION  SPECIMEN REMOVED: Not applicable  IMPLANTS: HIGINIO to distal RCA    APPROPRIATE USE CRITERIA (AUC): 9    POST-OP DIAGNOSIS:  Significant 1V CAD; HIGINIO to distal RCA    PLAN OF CARE:  [] D/C Home today  [] D/C in AM  [x] Return to In-patient bed  [] Admit for observation  [] Return for staged procedure:  [] CT Surgery consult called  [x] Continue DAPT & Statin therapy

## 2021-07-06 NOTE — ED ADULT NURSE NOTE - OBJECTIVE STATEMENT
pt presents to ed with c/o chest pain that radiates to left arm since earlier today. stemi code called upon arrival.

## 2021-07-06 NOTE — H&P ADULT - HISTORY OF PRESENT ILLNESS
57 YO F with PMHx of hypokalemia (on home supplementation), active smoker (1/2 PPD x 30 yrs), works as a  (denies any history of chest pain on exertion) presented to the ED with chest pain for 1 hour after she was under emotional stress   She described the chest pain as retrosternal, radiating toward neck / L arm, 8/10 in intensity, associated with diaphoresis, mild nausea.   In the ED VS were unremarkable.   Initial EKG showing rounded T-wave in the inferior leads, questionable for ST-elevation with TWI in lead I, aVL, V1-V2. However no clear ST elevations. On repeat EKG, improvement of the inferior T-wave abnormalities, but now with anterolateral ST-segment depression.    Code STEMI was cancelled and patient was added on for early cardiac catheterization tomorrow in AM.    Patient currently discussing the option of cath with her family at bedside. Reluctant to undergo cardiac catheterization. Risks and benefits explained to the patient along with the cardiac fellow at bedside. Patient more inclined towards undergoing cardiac catheterization  55 YO F with PMHx of hypokalemia (on home supplementation), active smoker (1/2 PPD x 30 yrs), works as a  (denies any history of chest pain on exertion) presented to the ED with chest pain for 1 hour after she was under emotional stress   She described the chest pain as retrosternal, radiating toward neck / L arm, 8/10 in intensity, associated with diaphoresis and mild nausea.   In the ED VS were unremarkable.   Initial EKG showing rounded T-wave in the inferior leads, questionable for ST-elevation with TWI in lead I, aVL, V1-V2. However no clear ST elevations. On repeat EKG, improvement of the inferior T-wave abnormalities, but now with anterolateral ST-segment depression.    Code STEMI was cancelled and patient was added on for early cardiac catheterization tomorrow in AM.    Patient currently discussing the option of cath with her family at bedside. Reluctant to undergo cardiac catheterization. Risks and benefits explained to the patient along with the cardiac fellow at bedside. Patient more inclined towards undergoing cardiac catheterization

## 2021-07-07 LAB
A1C WITH ESTIMATED AVERAGE GLUCOSE RESULT: 5.7 % — HIGH (ref 4–5.6)
ANION GAP SERPL CALC-SCNC: 6 MMOL/L — LOW (ref 7–14)
BUN SERPL-MCNC: 10 MG/DL — SIGNIFICANT CHANGE UP (ref 10–20)
CALCIUM SERPL-MCNC: 8.7 MG/DL — SIGNIFICANT CHANGE UP (ref 8.5–10.1)
CHLORIDE SERPL-SCNC: 104 MMOL/L — SIGNIFICANT CHANGE UP (ref 98–110)
CHOLEST SERPL-MCNC: 209 MG/DL — HIGH
CO2 SERPL-SCNC: 31 MMOL/L — SIGNIFICANT CHANGE UP (ref 17–32)
COVID-19 SPIKE DOMAIN AB INTERP: NEGATIVE — SIGNIFICANT CHANGE UP
COVID-19 SPIKE DOMAIN ANTIBODY RESULT: 0.4 U/ML — SIGNIFICANT CHANGE UP
CREAT SERPL-MCNC: 0.6 MG/DL — LOW (ref 0.7–1.5)
ESTIMATED AVERAGE GLUCOSE: 117 MG/DL — HIGH (ref 68–114)
GLUCOSE SERPL-MCNC: 92 MG/DL — SIGNIFICANT CHANGE UP (ref 70–99)
HCT VFR BLD CALC: 36.1 % — LOW (ref 37–47)
HDLC SERPL-MCNC: 42 MG/DL — LOW
HGB BLD-MCNC: 11.7 G/DL — LOW (ref 12–16)
LIPID PNL WITH DIRECT LDL SERPL: 154 MG/DL — HIGH
MCHC RBC-ENTMCNC: 27.3 PG — SIGNIFICANT CHANGE UP (ref 27–31)
MCHC RBC-ENTMCNC: 32.4 G/DL — SIGNIFICANT CHANGE UP (ref 32–37)
MCV RBC AUTO: 84.1 FL — SIGNIFICANT CHANGE UP (ref 81–99)
NON HDL CHOLESTEROL: 167 MG/DL — HIGH
NRBC # BLD: 0 /100 WBCS — SIGNIFICANT CHANGE UP (ref 0–0)
PLATELET # BLD AUTO: 241 K/UL — SIGNIFICANT CHANGE UP (ref 130–400)
POTASSIUM SERPL-MCNC: 3.6 MMOL/L — SIGNIFICANT CHANGE UP (ref 3.5–5)
POTASSIUM SERPL-SCNC: 3.6 MMOL/L — SIGNIFICANT CHANGE UP (ref 3.5–5)
RBC # BLD: 4.29 M/UL — SIGNIFICANT CHANGE UP (ref 4.2–5.4)
RBC # FLD: 15 % — HIGH (ref 11.5–14.5)
SARS-COV-2 IGG+IGM SERPL QL IA: 0.4 U/ML — SIGNIFICANT CHANGE UP
SARS-COV-2 IGG+IGM SERPL QL IA: NEGATIVE — SIGNIFICANT CHANGE UP
SODIUM SERPL-SCNC: 141 MMOL/L — SIGNIFICANT CHANGE UP (ref 135–146)
TRIGL SERPL-MCNC: 110 MG/DL — SIGNIFICANT CHANGE UP
TROPONIN T SERPL-MCNC: 0.3 NG/ML — CRITICAL HIGH
WBC # BLD: 7.97 K/UL — SIGNIFICANT CHANGE UP (ref 4.8–10.8)
WBC # FLD AUTO: 7.97 K/UL — SIGNIFICANT CHANGE UP (ref 4.8–10.8)

## 2021-07-07 PROCEDURE — 93010 ELECTROCARDIOGRAM REPORT: CPT

## 2021-07-07 PROCEDURE — 99233 SBSQ HOSP IP/OBS HIGH 50: CPT

## 2021-07-07 RX ORDER — CHLORHEXIDINE GLUCONATE 213 G/1000ML
1 SOLUTION TOPICAL DAILY
Refills: 0 | Status: DISCONTINUED | OUTPATIENT
Start: 2021-07-07 | End: 2021-07-08

## 2021-07-07 RX ORDER — METOPROLOL TARTRATE 50 MG
25 TABLET ORAL DAILY
Refills: 0 | Status: DISCONTINUED | OUTPATIENT
Start: 2021-07-08 | End: 2021-07-08

## 2021-07-07 RX ORDER — METOPROLOL TARTRATE 50 MG
12.5 TABLET ORAL
Refills: 0 | Status: COMPLETED | OUTPATIENT
Start: 2021-07-07 | End: 2021-07-07

## 2021-07-07 RX ORDER — ACETAMINOPHEN 500 MG
325 TABLET ORAL EVERY 6 HOURS
Refills: 0 | Status: DISCONTINUED | OUTPATIENT
Start: 2021-07-07 | End: 2021-07-08

## 2021-07-07 RX ORDER — ACETAMINOPHEN 500 MG
325 TABLET ORAL ONCE
Refills: 0 | Status: COMPLETED | OUTPATIENT
Start: 2021-07-07 | End: 2021-07-07

## 2021-07-07 RX ADMIN — Medication 325 MILLIGRAM(S): at 15:32

## 2021-07-07 RX ADMIN — Medication 12.5 MILLIGRAM(S): at 18:05

## 2021-07-07 RX ADMIN — CHLORHEXIDINE GLUCONATE 1 APPLICATION(S): 213 SOLUTION TOPICAL at 12:08

## 2021-07-07 RX ADMIN — CLOPIDOGREL BISULFATE 75 MILLIGRAM(S): 75 TABLET, FILM COATED ORAL at 12:08

## 2021-07-07 RX ADMIN — Medication 81 MILLIGRAM(S): at 12:07

## 2021-07-07 RX ADMIN — ATORVASTATIN CALCIUM 80 MILLIGRAM(S): 80 TABLET, FILM COATED ORAL at 22:07

## 2021-07-07 RX ADMIN — Medication 12.5 MILLIGRAM(S): at 05:01

## 2021-07-07 RX ADMIN — PANTOPRAZOLE SODIUM 40 MILLIGRAM(S): 20 TABLET, DELAYED RELEASE ORAL at 05:01

## 2021-07-07 NOTE — PROGRESS NOTE ADULT - ASSESSMENT
IMPRESSION:  - Acute MI s/p PCI to RCA  - Active smoker (1/2 PPD x 30 years)  - Hypokalemia (on chronic K supplementation at home)  - Hypomagnesemia     PLAN:    CNS: Avoid CNS Depressants     HEENT: Oral care. Aspiration precautions     PULMONARY: HOB @ 45. Monitor Pulse Ox    CARDIOVASCULAR:   - Continue Aspirin 81mg daily, Plavix 75mg daily and Atorvastatin 80mg daily  - Continue Metoprolol tartrate 12.5mg BID for now  - Check lipid profile and HbA1C  - Trend troponin until stable/downtrending      GI: GI prophylaxis    RENAL:   - Keep K>4 and Mg>2  - Monitor renal function and lytes; correct as needed    INFECTIOUS DISEASE: Monitor VS    HEMATOLOGICAL: DVT prophylaxis    ENDOCRINE: Monitor FS. Insulin protocol if needed. Check HbA1C    MUSCULOSKELETAL: Increase as tolerated IMPRESSION:  - Acute MI s/p PCI to RCA  - Active smoker (1/2 PPD x 30 years)  - Hypokalemia (on chronic K supplementation at home)  - Hypomagnesemia     PLAN:    CNS: Avoid CNS Depressants     HEENT: Oral care. Aspiration precautions     PULMONARY: HOB @ 45. Monitor Pulse Ox    CARDIOVASCULAR:   - Continue Aspirin 81mg daily, Plavix 75mg daily and Atorvastatin 80mg daily  - Change Metoprolol tartrate to metoprolol succinate 25mg daily  - Check lipid profile and HbA1C  - Trend troponin until stable/downtrending  - Will monitor for another 24h in CCU  - Smoking cessation recommended      GI: GI prophylaxis    RENAL:   - Keep K>4 and Mg>2  - Monitor renal function and lytes; correct as needed    INFECTIOUS DISEASE: Monitor VS    HEMATOLOGICAL: DVT prophylaxis    ENDOCRINE: Monitor FS. Insulin protocol if needed. Check HbA1C    MUSCULOSKELETAL: Increase as tolerated IMPRESSION:  - Acute MI s/p PCI to RCA  - Active smoker (1/2 PPD x 30 years)  - Hypokalemia (on chronic K supplementation at home)  - Hypomagnesemia   - NSVT     PLAN:    CNS: Avoid CNS Depressants     HEENT: Oral care. Aspiration precautions     PULMONARY: HOB @ 45. Monitor Pulse Ox    CARDIOVASCULAR:   - Continue Aspirin 81mg daily, Plavix 75mg daily and Atorvastatin 80mg daily  - Change Metoprolol tartrate to metoprolol succinate 25mg daily  - Check lipid profile and HbA1C  - Trend troponin until stable/downtrending  - Will monitor for another 24h in CCU  - Smoking cessation recommended      GI: GI prophylaxis    RENAL:   - Keep K>4 and Mg>2  - Monitor renal function and lytes; correct as needed    INFECTIOUS DISEASE: Monitor VS    HEMATOLOGICAL: DVT prophylaxis    ENDOCRINE: Monitor FS. Insulin protocol if needed. Check HbA1C    MUSCULOSKELETAL: Increase as tolerated

## 2021-07-07 NOTE — PROGRESS NOTE ADULT - SUBJECTIVE AND OBJECTIVE BOX
PATIENT:  SELMA LOBO  [id number]    CHIEF COMPLAINT:  Patient is a 56y old  Female who presents with a chief complaint of Chest pain (2021 02:18)      INTERVAL HISTORYOVERNIGHT EVENTS:      REVIEW OF SYSTEMS:    Constitutional:     [ ] negative [ ] fevers [ ] chills [ ] weight loss [ ] weight gain  HEENT:                  [ ] negative [ ] dry eyes [ ] eye irritation [ ] postnasal drip [ ] nasal congestion  CV:                         [ ] negative  [ ] chest pain [ ] orthopnea [ ] palpitations [ ] murmur  Resp:                     [ ] negative [ ] cough [ ] shortness of breath [ ] dyspnea [ ] wheezing [ ] sputum [ ] hemoptysis  GI:                          [ ] negative [ ] nausea [ ] vomiting [ ] diarrhea [ ] constipation [ ] abd pain [ ] dysphagia   :                        [ ] negative [ ] dysuria [ ] nocturia [ ] hematuria [ ] increased urinary frequency  Musculoskeletal: [ ] negative [ ] back pain [ ] myalgias [ ] arthralgias [ ] fracture  Skin:                       [ ] negative [ ] rash [ ] itch  Neurological:        [ ] negative [ ] headache [ ] dizziness [ ] syncope [ ] weakness [ ] numbness  Psychiatric:           [ ] negative [ ] anxiety [ ] depression  Endocrine:            [ ] negative [ ] diabetes [ ] thyroid problem  Heme/Lymph:      [ ] negative [ ] anemia [ ] bleeding problem  Allergic/Immune: [ ] negative [ ] itchy eyes [ ] nasal discharge [ ] hives [ ] angioedema    [ ] All other systems negative  [ ] Unable to assess ROS because ________.    MEDICATIONS:  MEDICATIONS  (STANDING):  aspirin  chewable 81 milliGRAM(s) Oral daily  atorvastatin 80 milliGRAM(s) Oral at bedtime  chlorhexidine 4% Liquid 1 Application(s) Topical daily  clopidogrel Tablet 75 milliGRAM(s) Oral daily  metoprolol tartrate 12.5 milliGRAM(s) Oral every 12 hours  pantoprazole    Tablet 40 milliGRAM(s) Oral before breakfast  sodium chloride 0.9%. 1000 milliLiter(s) (100 mL/Hr) IV Continuous <Continuous>    MEDICATIONS  (PRN):      ALLERGIES:  Allergies    No Known Allergies    Intolerances        OBJECTIVE:  ICU Vital Signs Last 24 Hrs  T(C): 36.6 (2021 04:00), Max: 36.7 (2021 20:00)  T(F): 97.9 (2021 04:00), Max: 98 (2021 20:00)  HR: 70 (2021 06:00) (64 - 87)  BP: 115/66 (2021 06:00) (102/58 - 135/80)  BP(mean): 91 (2021 06:00) (78 - 92)  ABP: --  ABP(mean): --  RR: 26 (2021 06:00) (11 - 26)  SpO2: 99% (2021 06:00) (96% - 100%)      Adult Advanced Hemodynamics Last 24 Hrs  CVP(mm Hg): --  CVP(cm H2O): --  CO: --  CI: --  PA: --  PA(mean): --  PCWP: --  SVR: --  SVRI: --  PVR: --  PVRI: --  CAPILLARY BLOOD GLUCOSE        CAPILLARY BLOOD GLUCOSE        I&O's Summary    2021 07:01  -  2021 06:31  --------------------------------------------------------  IN: 1730 mL / OUT: 900 mL / NET: 830 mL      Daily Height in cm: 167.64 (2021 13:35)    Daily Weight in k.9 (2021 06:00)    PHYSICAL EXAMINATION:  General: WN/WD NAD  HEENT: PERRLA, EOMI, moist mucous membranes  Neurology: A&Ox3, nonfocal, LESTER x 4  Respiratory: CTA B/L, normal respiratory effort, no wheezes, crackles, rales  CV: RRR, S1S2, no murmurs, rubs or gallops  Abdominal: Soft, NT, ND +BS, Last BM  Extremities: No edema, + peripheral pulses  Incisions:   Tubes:    LABS:                          11.7   7.97  )-----------( 241      ( 2021 04:50 )             36.1     07-07    141  |  104  |  10  ----------------------------<  92  3.6   |  31  |  0.6<L>    Ca    8.7      2021 04:50  Phos  3.2     07-06  Mg     2.0     07-06    TPro  6.6  /  Alb  4.0  /  TBili  0.2  /  DBili  x   /  AST  41  /  ALT  18  /  AlkPhos  83  07-06    LIVER FUNCTIONS - ( 2021 05:53 )  Alb: 4.0 g/dL / Pro: 6.6 g/dL / ALK PHOS: 83 U/L / ALT: 18 U/L / AST: 41 U/L / GGT: x           PT/INR - ( 2021 05:53 )   PT: 12.50 sec;   INR: 1.09 ratio         PTT - ( 2021 05:53 )  PTT:38.2 sec    CARDIAC MARKERS ( 2021 05:53 )  x     / 0.32 ng/mL / 562 U/L / x     / 44.1 ng/mL  CARDIAC MARKERS ( 2021 00:39 )  x     / <0.01 ng/mL / x     / x     / x              TELEMETRY:     EKG:     IMAGING:       PATIENT:  SELMA LOBO  [id number]    CHIEF COMPLAINT:  Patient is a 56y old  Female who presents with a chief complaint of Chest pain (2021 02:18)      INTERVAL HISTORYOVERNIGHT EVENTS: Pt says she slept well last night. Says she feels a little lightheaded this morning. Says she was nauseas yesterday possibly due to anesthesia side effects.  Says she prefers not to have whole team rounding in the morning because "I don't think its right having everyone coming in to see me like a project." Pt is s/p cath with stent placement in distal RCA. Denies chest pain, shortness of breath, vomiting, dizziness.         MEDICATIONS:  MEDICATIONS  (STANDING):  aspirin  chewable 81 milliGRAM(s) Oral daily  atorvastatin 80 milliGRAM(s) Oral at bedtime  chlorhexidine 4% Liquid 1 Application(s) Topical daily  clopidogrel Tablet 75 milliGRAM(s) Oral daily  metoprolol tartrate 12.5 milliGRAM(s) Oral every 12 hours  pantoprazole    Tablet 40 milliGRAM(s) Oral before breakfast  sodium chloride 0.9%. 1000 milliLiter(s) (100 mL/Hr) IV Continuous <Continuous>    MEDICATIONS  (PRN):      ALLERGIES:  Allergies    No Known Allergies    Intolerances        OBJECTIVE:  ICU Vital Signs Last 24 Hrs  T(C): 36.6 (2021 04:00), Max: 36.7 (2021 20:00)  T(F): 97.9 (2021 04:00), Max: 98 (2021 20:00)  HR: 70 (2021 06:00) (64 - 87)  BP: 115/66 (2021 06:00) (102/58 - 135/80)  BP(mean): 91 (2021 06:00) (78 - 92)  ABP: --  ABP(mean): --  RR: 26 (2021 06:00) (11 - 26)  SpO2: 99% (2021 06:00) (96% - 100%)      Adult Advanced Hemodynamics Last 24 Hrs  CVP(mm Hg): --  CVP(cm H2O): --  CO: --  CI: --  PA: --  PA(mean): --  PCWP: --  SVR: --  SVRI: --  PVR: --  PVRI: --  CAPILLARY BLOOD GLUCOSE        CAPILLARY BLOOD GLUCOSE        I&O's Summary    2021 07:01  -  2021 06:31  --------------------------------------------------------  IN: 1730 mL / OUT: 900 mL / NET: 830 mL      Daily Height in cm: 167.64 (2021 13:35)    Daily Weight in k.9 (2021 06:00)    PHYSICAL EXAMINATION:  General: Resting comfortably in bed, no acute distress  Respiratory: Clear to auscultation to bilaterally. No rales, rhonchi or wheezing.   CV: RRR. No murmurs, rubs or gallops    Incisions:   Tubes:    LABS:                          11.7   7.97  )-----------( 241      ( 2021 04:50 )             36.1     07-07    141  |  104  |  10  ----------------------------<  92  3.6   |  31  |  0.6<L>    Ca    8.7      2021 04:50  Phos  3.2     07-06  Mg     2.0     07-06    TPro  6.6  /  Alb  4.0  /  TBili  0.2  /  DBili  x   /  AST  41  /  ALT  18  /  AlkPhos  83  07-06    LIVER FUNCTIONS - ( 2021 05:53 )  Alb: 4.0 g/dL / Pro: 6.6 g/dL / ALK PHOS: 83 U/L / ALT: 18 U/L / AST: 41 U/L / GGT: x           PT/INR - ( 2021 05:53 )   PT: 12.50 sec;   INR: 1.09 ratio         PTT - ( 2021 05:53 )  PTT:38.2 sec    CARDIAC MARKERS ( 2021 05:53 )  x     / 0.32 ng/mL / 562 U/L / x     / 44.1 ng/mL  CARDIAC MARKERS ( 2021 00:39 )  x     / <0.01 ng/mL / x     / x     / x              TELEMETRY:     EKG: < from: 12 Lead ECG (21 @ 13:03) >  Ventricular Rate 66 BPM    Atrial Rate 66 BPM    P-R Interval 113 ms    QRS Duration 104 ms    Q-T Interval 400 ms    QTC Calculation(Bazett) 420 ms    P Axis 58 degrees    R Axis 19 degrees    T Axis 18 degrees    Diagnosis Line Normal sinus rhythm  Low voltage QRS  Incomplete right bundle branch block  Borderline ECG    < end of copied text >      IMAGING: < from: Xray Chest 1 View- PORTABLE-Urgent (Xray Chest 1 View- PORTABLE-Urgent .) (21 @ 03:09) >  EXAM:  XR CHEST PORTABLE URGENT 1V            PROCEDURE DATE:  2021            INTERPRETATION:  Clinical History / Reason for exam: Cardiac arrest    Comparison : Chest radiograph 2016.    Technique/Positioning: Single AP view of the chest.    Findings:    Support devices: None.    Cardiac/mediastinum/hilum: Unremarkable.    Lung parenchyma/Pleura: No consolidation, effusion or pneumothorax.    Skeleton/soft tissues: No acute abnormality.    Impression:    No consolidation, effusion or pneumothorax.    < end of copied text >         PATIENT:  SELMA LOBO  [id number]    CHIEF COMPLAINT:  Patient is a 56y old  Female who presents with a chief complaint of Chest pain (2021 02:18)      INTERVAL HISTORYOVERNIGHT EVENTS: Pt says she slept well last night. Says she feels a little lightheaded this morning. Says she was nauseas yesterday possibly due to anesthesia side effects.  Says she prefers not to have whole team rounding in the morning because "I don't think its right having everyone coming in to see me like a project." Pt is s/p cath with stent placement in distal RCA. Denies chest pain, shortness of breath, vomiting, dizziness.         MEDICATIONS:  MEDICATIONS  (STANDING):  aspirin  chewable 81 milliGRAM(s) Oral daily  atorvastatin 80 milliGRAM(s) Oral at bedtime  chlorhexidine 4% Liquid 1 Application(s) Topical daily  clopidogrel Tablet 75 milliGRAM(s) Oral daily  metoprolol tartrate 12.5 milliGRAM(s) Oral every 12 hours  pantoprazole    Tablet 40 milliGRAM(s) Oral before breakfast  sodium chloride 0.9%. 1000 milliLiter(s) (100 mL/Hr) IV Continuous <Continuous>    MEDICATIONS  (PRN):      ALLERGIES:  Allergies    No Known Allergies    Intolerances        OBJECTIVE:  ICU Vital Signs Last 24 Hrs  T(C): 36.6 (2021 04:00), Max: 36.7 (2021 20:00)  T(F): 97.9 (2021 04:00), Max: 98 (2021 20:00)  HR: 70 (2021 06:00) (64 - 87)  BP: 115/66 (2021 06:00) (102/58 - 135/80)  BP(mean): 91 (2021 06:00) (78 - 92)  ABP: --  ABP(mean): --  RR: 26 (2021 06:00) (11 - 26)  SpO2: 99% (2021 06:00) (96% - 100%)      Adult Advanced Hemodynamics Last 24 Hrs  CVP(mm Hg): --  CVP(cm H2O): --  CO: --  CI: --  PA: --  PA(mean): --  PCWP: --  SVR: --  SVRI: --  PVR: --  PVRI: --  CAPILLARY BLOOD GLUCOSE        CAPILLARY BLOOD GLUCOSE        I&O's Summary    2021 07:01  -  2021 06:31  --------------------------------------------------------  IN: 1730 mL / OUT: 900 mL / NET: 830 mL      Daily Height in cm: 167.64 (2021 13:35)    Daily Weight in k.9 (2021 06:00)    PHYSICAL EXAMINATION:  General: Resting comfortably in bed, no acute distress  Respiratory: Clear to auscultation to bilaterally. No rales, rhonchi or wheezing.   CV: RRR. No murmurs, rubs or gallops    Incisions:   Tubes:    LABS:                          11.7   7.97  )-----------( 241      ( 2021 04:50 )             36.1     07-07    141  |  104  |  10  ----------------------------<  92  3.6   |  31  |  0.6<L>    Ca    8.7      2021 04:50  Phos  3.2     07-06  Mg     2.0     07-06    TPro  6.6  /  Alb  4.0  /  TBili  0.2  /  DBili  x   /  AST  41  /  ALT  18  /  AlkPhos  83  07-06    LIVER FUNCTIONS - ( 2021 05:53 )  Alb: 4.0 g/dL / Pro: 6.6 g/dL / ALK PHOS: 83 U/L / ALT: 18 U/L / AST: 41 U/L / GGT: x           PT/INR - ( 2021 05:53 )   PT: 12.50 sec;   INR: 1.09 ratio         PTT - ( 2021 05:53 )  PTT:38.2 sec    CARDIAC MARKERS ( 2021 05:53 )  x     / 0.32 ng/mL / 562 U/L / x     / 44.1 ng/mL  CARDIAC MARKERS ( 2021 00:39 )  x     / <0.01 ng/mL / x     / x     / x              TELEMETRY: NSVT    EKG: < from: 12 Lead ECG (21 @ 13:03) >  Ventricular Rate 66 BPM    Atrial Rate 66 BPM    P-R Interval 113 ms    QRS Duration 104 ms    Q-T Interval 400 ms    QTC Calculation(Bazett) 420 ms    P Axis 58 degrees    R Axis 19 degrees    T Axis 18 degrees    Diagnosis Line Normal sinus rhythm  Low voltage QRS  Incomplete right bundle branch block  Borderline ECG    < end of copied text >      IMAGING: < from: Xray Chest 1 View- PORTABLE-Urgent (Xray Chest 1 View- PORTABLE-Urgent .) (21 @ 03:09) >  EXAM:  XR CHEST PORTABLE URGENT 1V            PROCEDURE DATE:  2021            INTERPRETATION:  Clinical History / Reason for exam: Cardiac arrest    Comparison : Chest radiograph 2016.    Technique/Positioning: Single AP view of the chest.    Findings:    Support devices: None.    Cardiac/mediastinum/hilum: Unremarkable.    Lung parenchyma/Pleura: No consolidation, effusion or pneumothorax.    Skeleton/soft tissues: No acute abnormality.    Impression:    No consolidation, effusion or pneumothorax.    < end of copied text >

## 2021-07-07 NOTE — PROGRESS NOTE ADULT - SUBJECTIVE AND OBJECTIVE BOX
Cardiology Follow up    SELMA LOBO   56y Female  PAST MEDICAL & SURGICAL HISTORY:  Hypokalemia       HPI:  55 YO F with PMHx of hypokalemia (on home supplementation), active smoker (1/2 PPD x 30 yrs), works as a  (denies any history of chest pain on exertion) presented to the ED with chest pain for 1 hour after she was under emotional stress   She described the chest pain as retrosternal, radiating toward neck / L arm, 8/10 in intensity, associated with diaphoresis and mild nausea.   In the ED VS were unremarkable.   Initial EKG showing rounded T-wave in the inferior leads, questionable for ST-elevation with TWI in lead I, aVL, V1-V2. However no clear ST elevations. On repeat EKG, improvement of the inferior T-wave abnormalities, but now with anterolateral ST-segment depression.    Code STEMI was cancelled and patient was added on for early cardiac catheterization tomorrow in AM.    Patient currently discussing the option of cath with her family at bedside. Reluctant to undergo cardiac catheterization. Risks and benefits explained to the patient along with the cardiac fellow at bedside. Patient more inclined towards undergoing cardiac catheterization  (06 Jul 2021 02:18)    Allergies    No Known Allergies    Intolerances    Patient seen and examined at bedside. No acute events overnight.  Patient reported feeling lightheaded this morning.  Denies CP, SOB, palpitations, or dizziness  NSVT 17 bats event noted on telemetry     Vital Signs Last 24 Hrs  T(C): 36.6 (07 Jul 2021 04:00), Max: 36.7 (06 Jul 2021 20:00)  T(F): 97.9 (07 Jul 2021 04:00), Max: 98 (06 Jul 2021 20:00)  HR: 68 (07 Jul 2021 10:00) (64 - 80)  BP: 138/67 (07 Jul 2021 10:00) (102/58 - 138/67)  BP(mean): 87 (07 Jul 2021 10:00) (78 - 92)  RR: 24 (07 Jul 2021 08:00) (11 - 26)  SpO2: 99% (07 Jul 2021 08:00) (96% - 100%)    MEDICATIONS  (STANDING):  aspirin  chewable 81 milliGRAM(s) Oral daily  atorvastatin 80 milliGRAM(s) Oral at bedtime  chlorhexidine 4% Liquid 1 Application(s) Topical daily  clopidogrel Tablet 75 milliGRAM(s) Oral daily  metoprolol tartrate 12.5 milliGRAM(s) Oral <User Schedule>  pantoprazole    Tablet 40 milliGRAM(s) Oral before breakfast  sodium chloride 0.9%. 1000 milliLiter(s) (100 mL/Hr) IV Continuous <Continuous>    MEDICATIONS  (PRN):      REVIEW OF SYSTEMS:          All negative except as mentioned in HPI    PHYSICAL EXAM:           CONSTITUTIONAL: Well-developed; well-nourished; in no acute distress  	SKIN: warm, dry  	HEAD: Normocephalic; atraumatic  	EYES: PERRL.  	ENT: No nasal discharge, airway clear, mucous membranes moist  	NECK: Supple; non tender.  	CARD: +S1, +S2, no murmurs, gallops, or rubs. Regular rate and rhythm    	RESP: No wheezes, rales or rhonchi. CTA B/L  	ABD: soft ntnd, + BS x 4 quadrants  	EXT: moves all extremities,  no clubbing, cyanosis or edema  	NEURO: Alert and oriented x3, no focal deficits          PSYCH: Cooperative, appropriate          VASCULAR:  + Rad / + PTs / +  DPs          EXTREMITY:              Right Radial: pressure dressing removed, access site soft, small ecchymotic area noted, no hematoma, no pain, + pulses, no sign of infection, no numbness            ECG:   < from: 12 Lead ECG (07.07.21 @ 04:42) >    Ventricular Rate 71 BPM    Atrial Rate 71 BPM    P-R Interval 118 ms    QRS Duration 96 ms    Q-T Interval 402 ms    QTC Calculation(Bazett) 436 ms    P Axis 68 degrees    R Axis 40 degrees    T Axis -6 degrees    Diagnosis Line Normal sinus rhythm  Incomplete right bundle branch block  Borderline ECG    Confirmed by Kelvin Mathis ([id number]) on 7/7/2021 7:27:43 AM                                                                                         2D ECHO:  < from: TTE Echo Complete w/o Contrast w/ Doppler (07.06.21 @ 10:49) >    Summary:   1. Normal left atrial size.   2. Normal right atrial size.   3. Mild mitral valve regurgitation.   4. Mild-moderate tricuspid regurgitation.    LABS:                        11.7   7.97  )-----------( 241      ( 07 Jul 2021 04:50 )             36.1     07-07    141  |  104  |  10  ----------------------------<  92  3.6   |  31  |  0.6<L>    Ca    8.7      07 Jul 2021 04:50  Phos  3.2     07-06  Mg     2.0     07-06    TPro  6.6  /  Alb  4.0  /  TBili  0.2  /  DBili  x   /  AST  41  /  ALT  18  /  AlkPhos  83  07-06    CARDIAC MARKERS ( 06 Jul 2021 05:53 )  x     / 0.32 ng/mL / 562 U/L / x     / 44.1 ng/mL  CARDIAC MARKERS ( 06 Jul 2021 00:39 )  x     / <0.01 ng/mL / x     / x     / x        Magnesium, Serum: 2.0 mg/dL [1.8 - 2.4] (07-06-21 @ 20:06)  LIVER FUNCTIONS - ( 06 Jul 2021 05:53 )  Alb: 4.0 g/dL / Pro: 6.6 g/dL / ALK PHOS: 83 U/L / ALT: 18 U/L / AST: 41 U/L / GGT: x             A/P:  I discussed the case with Cardiologist Dr. Mitchell and recommend the following:    S/P PCI:   LEFT HEART CATHETERIZATION  Left main: Mild disease  LAD: Mild disease  Diag: Mild disease  Left Circumflex: Minor disease  OM: Minor disease  Right Coronary Artery: 90% thrombotic lesion distal   RPDA: Mild disease  PLS: Mild disease    INTERVENTION  SPECIMEN REMOVED: Not applicable  IMPLANTS: HIGINIO to distal RCA    APPROPRIATE USE CRITERIA (AUC): 9    POST-OP DIAGNOSIS:  Significant 1V CAD; HIGINIO to distal RCA                     Care as per CCU team                   No ACEi/ARB due to marginal B/P, will reevaluate tomorrow                   Keep K = 4, Mg = 2                   DVT / GI prophylaxis                     OOB to chair, ambulate with assistance                    F/U LDL, A1C results                    Continue DAPT ( Aspirin 81 mg PO Daily and Plavix 75 mg PO Daily ), B-Blocker, Statin Therapy                   Patient given 30 day supply of ( Aspirin 81 mg daily and Plavix 75 mg daily ) to take at home                   Patient agreeing to take DAPT for at least one year or as directed by cardiologist                    Pt given instructions on importance of taking antiplatelet medication or risk acute stent thrombosis/death                   Post cath instructions, access site care and activity restrictions reviewed with patient                     Discussed with patient to return to hospital if experience chest pain, shortness breath, dizziness and site bleeding                   Aggressive risk factor modification, diet counseling, smoking cessation discussed with patient                       Cardiac rehab information provided/ referral and communication to cardiac rehab provided                   Monitor in CCU

## 2021-07-08 VITALS — RESPIRATION RATE: 18 BRPM | DIASTOLIC BLOOD PRESSURE: 60 MMHG | HEART RATE: 74 BPM | SYSTOLIC BLOOD PRESSURE: 104 MMHG

## 2021-07-08 LAB
A1C WITH ESTIMATED AVERAGE GLUCOSE RESULT: 6 % — HIGH (ref 4–5.6)
ALBUMIN SERPL ELPH-MCNC: 4.2 G/DL — SIGNIFICANT CHANGE UP (ref 3.5–5.2)
ALP SERPL-CCNC: 84 U/L — SIGNIFICANT CHANGE UP (ref 30–115)
ALT FLD-CCNC: 28 U/L — SIGNIFICANT CHANGE UP (ref 0–41)
ANION GAP SERPL CALC-SCNC: 9 MMOL/L — SIGNIFICANT CHANGE UP (ref 7–14)
AST SERPL-CCNC: 36 U/L — SIGNIFICANT CHANGE UP (ref 0–41)
BILIRUB SERPL-MCNC: 0.4 MG/DL — SIGNIFICANT CHANGE UP (ref 0.2–1.2)
BUN SERPL-MCNC: 7 MG/DL — LOW (ref 10–20)
CALCIUM SERPL-MCNC: 9.1 MG/DL — SIGNIFICANT CHANGE UP (ref 8.5–10.1)
CHLORIDE SERPL-SCNC: 96 MMOL/L — LOW (ref 98–110)
CHOLEST SERPL-MCNC: 233 MG/DL — HIGH
CO2 SERPL-SCNC: 34 MMOL/L — HIGH (ref 17–32)
CREAT SERPL-MCNC: 0.5 MG/DL — LOW (ref 0.7–1.5)
ESTIMATED AVERAGE GLUCOSE: 126 MG/DL — HIGH (ref 68–114)
GLUCOSE SERPL-MCNC: 87 MG/DL — SIGNIFICANT CHANGE UP (ref 70–99)
HCT VFR BLD CALC: 38.6 % — SIGNIFICANT CHANGE UP (ref 37–47)
HDLC SERPL-MCNC: 42 MG/DL — LOW
HGB BLD-MCNC: 12.6 G/DL — SIGNIFICANT CHANGE UP (ref 12–16)
LIPID PNL WITH DIRECT LDL SERPL: 167 MG/DL — HIGH
MAGNESIUM SERPL-MCNC: 1.7 MG/DL — LOW (ref 1.8–2.4)
MCHC RBC-ENTMCNC: 27.1 PG — SIGNIFICANT CHANGE UP (ref 27–31)
MCHC RBC-ENTMCNC: 32.6 G/DL — SIGNIFICANT CHANGE UP (ref 32–37)
MCV RBC AUTO: 83 FL — SIGNIFICANT CHANGE UP (ref 81–99)
NON HDL CHOLESTEROL: 191 MG/DL — HIGH
NRBC # BLD: 0 /100 WBCS — SIGNIFICANT CHANGE UP (ref 0–0)
PLATELET # BLD AUTO: 263 K/UL — SIGNIFICANT CHANGE UP (ref 130–400)
POTASSIUM SERPL-MCNC: 3.1 MMOL/L — LOW (ref 3.5–5)
POTASSIUM SERPL-SCNC: 3.1 MMOL/L — LOW (ref 3.5–5)
PROT SERPL-MCNC: 6.9 G/DL — SIGNIFICANT CHANGE UP (ref 6–8)
RBC # BLD: 4.65 M/UL — SIGNIFICANT CHANGE UP (ref 4.2–5.4)
RBC # FLD: 14.6 % — HIGH (ref 11.5–14.5)
SODIUM SERPL-SCNC: 139 MMOL/L — SIGNIFICANT CHANGE UP (ref 135–146)
TRIGL SERPL-MCNC: 119 MG/DL — SIGNIFICANT CHANGE UP
WBC # BLD: 9.19 K/UL — SIGNIFICANT CHANGE UP (ref 4.8–10.8)
WBC # FLD AUTO: 9.19 K/UL — SIGNIFICANT CHANGE UP (ref 4.8–10.8)

## 2021-07-08 PROCEDURE — 99231 SBSQ HOSP IP/OBS SF/LOW 25: CPT

## 2021-07-08 PROCEDURE — 93010 ELECTROCARDIOGRAM REPORT: CPT

## 2021-07-08 RX ORDER — METOPROLOL TARTRATE 50 MG
12.5 TABLET ORAL DAILY
Refills: 0 | Status: CANCELLED | OUTPATIENT
Start: 2021-07-09 | End: 2021-07-08

## 2021-07-08 RX ORDER — POTASSIUM CHLORIDE 20 MEQ
40 PACKET (EA) ORAL ONCE
Refills: 0 | Status: COMPLETED | OUTPATIENT
Start: 2021-07-08 | End: 2021-07-08

## 2021-07-08 RX ORDER — MAGNESIUM SULFATE 500 MG/ML
2 VIAL (ML) INJECTION ONCE
Refills: 0 | Status: COMPLETED | OUTPATIENT
Start: 2021-07-08 | End: 2021-07-08

## 2021-07-08 RX ORDER — POTASSIUM CHLORIDE 20 MEQ
1 PACKET (EA) ORAL
Qty: 0 | Refills: 0 | DISCHARGE

## 2021-07-08 RX ORDER — ATORVASTATIN CALCIUM 80 MG/1
1 TABLET, FILM COATED ORAL
Qty: 90 | Refills: 0
Start: 2021-07-08 | End: 2021-10-05

## 2021-07-08 RX ORDER — POTASSIUM CHLORIDE 20 MEQ
1 PACKET (EA) ORAL
Qty: 1 | Refills: 0
Start: 2021-07-08 | End: 2021-07-08

## 2021-07-08 RX ORDER — METOPROLOL TARTRATE 50 MG
0.5 TABLET ORAL
Qty: 15 | Refills: 2
Start: 2021-07-08 | End: 2021-10-05

## 2021-07-08 RX ORDER — ASPIRIN/CALCIUM CARB/MAGNESIUM 324 MG
1 TABLET ORAL
Qty: 90 | Refills: 2
Start: 2021-07-08 | End: 2022-04-03

## 2021-07-08 RX ORDER — POTASSIUM CHLORIDE 20 MEQ
20 PACKET (EA) ORAL ONCE
Refills: 0 | Status: COMPLETED | OUTPATIENT
Start: 2021-07-08 | End: 2021-07-08

## 2021-07-08 RX ORDER — CLOPIDOGREL BISULFATE 75 MG/1
1 TABLET, FILM COATED ORAL
Qty: 30 | Refills: 3
Start: 2021-07-08 | End: 2021-11-04

## 2021-07-08 RX ADMIN — Medication 50 GRAM(S): at 08:15

## 2021-07-08 RX ADMIN — Medication 325 MILLIGRAM(S): at 06:45

## 2021-07-08 RX ADMIN — PANTOPRAZOLE SODIUM 40 MILLIGRAM(S): 20 TABLET, DELAYED RELEASE ORAL at 05:59

## 2021-07-08 RX ADMIN — Medication 325 MILLIGRAM(S): at 06:12

## 2021-07-08 RX ADMIN — Medication 40 MILLIEQUIVALENT(S): at 08:17

## 2021-07-08 RX ADMIN — Medication 25 MILLIGRAM(S): at 05:59

## 2021-07-08 NOTE — DISCHARGE NOTE PROVIDER - NSDCFUADDAPPT_GEN_ALL_CORE_FT
Please follow up at [facility]  [address] [place] [id number] [phone]

## 2021-07-08 NOTE — DISCHARGE NOTE NURSING/CASE MANAGEMENT/SOCIAL WORK - PATIENT PORTAL LINK FT
You can access the FollowMyHealth Patient Portal offered by [facility] by registering at the following website: [email or web address]. By joining [employer]’s FollowMyHealth portal, you will also be able to view your health information using other applications (apps) compatible with our system.

## 2021-07-08 NOTE — PROGRESS NOTE ADULT - ASSESSMENT
IMPRESSION:  - Acute MI s/p PCI to RCA  - Active smoker (1/2 PPD x 30 years)  - Hypokalemia (on chronic K supplementation at home)  - Hypomagnesemia   - NSVT     PLAN:    CNS: Avoid CNS Depressants     HEENT: Oral care. Aspiration precautions     PULMONARY: HOB @ 45. Monitor Pulse Ox    CARDIOVASCULAR:   - Continue Aspirin 81mg daily, Plavix 75mg daily and Atorvastatin 80mg daily  - Decrease metoprolol succinate to 12.5mg daily (due to side effects)  - Smoking cessation recommended  - Patient stable for discharge home today with outpatient follow-up with cardiology      GI: GI prophylaxis    RENAL:   - Replete Mg and K  - Keep K>4 and Mg>2  - Monitor renal function and lytes; correct as needed    INFECTIOUS DISEASE: Monitor VS    HEMATOLOGICAL: DVT prophylaxis    ENDOCRINE: Monitor FS. Insulin protocol if needed.    MUSCULOSKELETAL: Increase as tolerated   IMPRESSION:  - Acute MI s/p PCI to RCA  - Active smoker (1/2 PPD x 30 years)  - Hypokalemia (on chronic K supplementation at home)  - Hypomagnesemia   - NSVT resolved    PLAN:    CNS: Avoid CNS Depressants     HEENT: Oral care. Aspiration precautions     PULMONARY: HOB @ 45. Monitor Pulse Ox    CARDIOVASCULAR:   - Continue Aspirin 81mg daily, Plavix 75mg daily and Atorvastatin 80mg daily  - Decrease metoprolol succinate to 12.5mg daily (due to side effects)  - Smoking cessation recommended  - Patient stable for discharge home today with outpatient follow-up with cardiology      GI: GI prophylaxis    RENAL:   - Replete Mg and K  - Keep K>4 and Mg>2  - Monitor renal function and lytes; correct as needed    INFECTIOUS DISEASE: Monitor VS    HEMATOLOGICAL: DVT prophylaxis    ENDOCRINE: Monitor FS. Insulin protocol if needed.    MUSCULOSKELETAL: Increase as tolerated

## 2021-07-08 NOTE — DISCHARGE NOTE PROVIDER - CARE PROVIDER_API CALL
Marquis Mitchell)  Cardiovascular Disease; Interventional Cardiology  [address] [place] [address]  Phone: [phone]  Fax: [phone]  Follow Up Time: 1 week

## 2021-07-08 NOTE — PROGRESS NOTE ADULT - SUBJECTIVE AND OBJECTIVE BOX
PATIENT:  SELMA LOBO  [id number]    CHIEF COMPLAINT:  Patient is a 56y old  Female who presents with a chief complaint of Chest pain (2021 11:15)      INTERVAL HISTORYOVERNIGHT EVENTS:      REVIEW OF SYSTEMS:    Constitutional:     [ ] negative [ ] fevers [ ] chills [ ] weight loss [ ] weight gain  HEENT:                  [ ] negative [ ] dry eyes [ ] eye irritation [ ] postnasal drip [ ] nasal congestion  CV:                         [ ] negative  [ ] chest pain [ ] orthopnea [ ] palpitations [ ] murmur  Resp:                     [ ] negative [ ] cough [ ] shortness of breath [ ] dyspnea [ ] wheezing [ ] sputum [ ] hemoptysis  GI:                          [ ] negative [ ] nausea [ ] vomiting [ ] diarrhea [ ] constipation [ ] abd pain [ ] dysphagia   :                        [ ] negative [ ] dysuria [ ] nocturia [ ] hematuria [ ] increased urinary frequency  Musculoskeletal: [ ] negative [ ] back pain [ ] myalgias [ ] arthralgias [ ] fracture  Skin:                       [ ] negative [ ] rash [ ] itch  Neurological:        [ ] negative [ ] headache [ ] dizziness [ ] syncope [ ] weakness [ ] numbness  Psychiatric:           [ ] negative [ ] anxiety [ ] depression  Endocrine:            [ ] negative [ ] diabetes [ ] thyroid problem  Heme/Lymph:      [ ] negative [ ] anemia [ ] bleeding problem  Allergic/Immune: [ ] negative [ ] itchy eyes [ ] nasal discharge [ ] hives [ ] angioedema    [ ] All other systems negative  [ ] Unable to assess ROS because ________.    MEDICATIONS:  MEDICATIONS  (STANDING):  aspirin  chewable 81 milliGRAM(s) Oral daily  atorvastatin 80 milliGRAM(s) Oral at bedtime  chlorhexidine 4% Liquid 1 Application(s) Topical daily  clopidogrel Tablet 75 milliGRAM(s) Oral daily  metoprolol succinate ER 25 milliGRAM(s) Oral daily  pantoprazole    Tablet 40 milliGRAM(s) Oral before breakfast  sodium chloride 0.9%. 1000 milliLiter(s) (100 mL/Hr) IV Continuous <Continuous>    MEDICATIONS  (PRN):  acetaminophen   Tablet .. 325 milliGRAM(s) Oral every 6 hours PRN Moderate Pain (4 - 6)      ALLERGIES:  Allergies    No Known Allergies    Intolerances        OBJECTIVE:  ICU Vital Signs Last 24 Hrs  T(C): 36.7 (2021 00:00), Max: 36.8 (2021 16:01)  T(F): 98 (2021 00:00), Max: 98.3 (2021 16:01)  HR: 66 (2021 02:00) (64 - 74)  BP: 116/71 (2021 02:00) (113/67 - 145/72)  BP(mean): 92 (2021 02:00) (82 - 110)  ABP: --  ABP(mean): --  RR: 18 (2021 02:00) (16 - 26)  SpO2: 100% (2021 02:00) (99% - 100%)      Adult Advanced Hemodynamics Last 24 Hrs  CVP(mm Hg): --  CVP(cm H2O): --  CO: --  CI: --  PA: --  PA(mean): --  PCWP: --  SVR: --  SVRI: --  PVR: --  PVRI: --  CAPILLARY BLOOD GLUCOSE        CAPILLARY BLOOD GLUCOSE        I&O's Summary    2021 07:01  -  2021 07:00  --------------------------------------------------------  IN: 1730 mL / OUT: 900 mL / NET: 830 mL    2021 07:01  -  2021 05:58  --------------------------------------------------------  IN: 1090 mL / OUT: 1350 mL / NET: -260 mL      Daily     Daily Weight in k.9 (2021 06:00)    PHYSICAL EXAMINATION:  General: WN/WD NAD  HEENT: PERRLA, EOMI, moist mucous membranes  Neurology: A&Ox3, nonfocal, LESTER x 4  Respiratory: CTA B/L, normal respiratory effort, no wheezes, crackles, rales  CV: RRR, S1S2, no murmurs, rubs or gallops  Abdominal: Soft, NT, ND +BS, Last BM  Extremities: No edema, + peripheral pulses  Incisions:   Tubes:    LABS:                          12.6   9.19  )-----------( 263      ( 2021 04:43 )             38.6         141  |  104  |  10  ----------------------------<  92  3.6   |  31  |  0.6<L>    Ca    8.7      2021 04:50  Mg     2.0     07-          Troponin T, Serum: 0.30 ng/mL ( @ 11:37)    CARDIAC MARKERS ( 2021 11:37 )  x     / 0.30 ng/mL / x     / x     / x              TELEMETRY:     EKG:     IMAGING:       PATIENT:  SELMA LOBO  [id number]    CHIEF COMPLAINT:  Patient is a 56y old  Female who presents with a chief complaint of Chest pain (2021 11:15)      OVERNIGHT EVENTS: Pt says she slept "ok" last night, didn't get much sleep. Says she still feels slightly lightheaded and "loopy" possibly as side effect from metoprolol. Pt also complains of increasing headaches. Denies chest pain, shortness of breath, nausea, vomiting, dizziness. No other complaints at this time.         MEDICATIONS:  MEDICATIONS  (STANDING):  aspirin  chewable 81 milliGRAM(s) Oral daily  atorvastatin 80 milliGRAM(s) Oral at bedtime  chlorhexidine 4% Liquid 1 Application(s) Topical daily  clopidogrel Tablet 75 milliGRAM(s) Oral daily  metoprolol succinate ER 25 milliGRAM(s) Oral daily  pantoprazole    Tablet 40 milliGRAM(s) Oral before breakfast  sodium chloride 0.9%. 1000 milliLiter(s) (100 mL/Hr) IV Continuous <Continuous>    MEDICATIONS  (PRN):  acetaminophen   Tablet .. 325 milliGRAM(s) Oral every 6 hours PRN Moderate Pain (4 - 6)      ALLERGIES:  Allergies    No Known Allergies    Intolerances        OBJECTIVE:  ICU Vital Signs Last 24 Hrs  T(C): 36.7 (2021 00:00), Max: 36.8 (2021 16:01)  T(F): 98 (2021 00:00), Max: 98.3 (2021 16:01)  HR: 66 (2021 02:00) (64 - 74)  BP: 116/71 (2021 02:00) (113/67 - 145/72)  BP(mean): 92 (2021 02:00) (82 - 110)  ABP: --  ABP(mean): --  RR: 18 (2021 02:00) (16 - 26)  SpO2: 100% (2021 02:00) (99% - 100%)      Adult Advanced Hemodynamics Last 24 Hrs  CVP(mm Hg): --  CVP(cm H2O): --  CO: --  CI: --  PA: --  PA(mean): --  PCWP: --  SVR: --  SVRI: --  PVR: --  PVRI: --  CAPILLARY BLOOD GLUCOSE        CAPILLARY BLOOD GLUCOSE        I&O's Summary    2021 07:01  -  2021 07:00  --------------------------------------------------------  IN: 1730 mL / OUT: 900 mL / NET: 830 mL    2021 07:01  -  2021 05:58  --------------------------------------------------------  IN: 1090 mL / OUT: 1350 mL / NET: -260 mL      Daily     Daily Weight in k.9 (2021 06:00)    PHYSICAL EXAMINATION:  General: Resting comfortably in bed. No acute distress  Respiratory: Lung sounds clear to auscultation bilaterally. No rales, rhonchi or wheezing  CV: Regular rate and rhythm. No murmurs, rubs or gallops    Incisions:   Tubes:    LABS:                          12.6   9.19  )-----------( 263      ( 2021 04:43 )             38.6         141  |  104  |  10  ----------------------------<  92  3.6   |  31  |  0.6<L>    Ca    8.7      2021 04:50  Mg     2.0     -          Troponin T, Serum: 0.30 ng/mL ( @ 11:37)    CARDIAC MARKERS ( 2021 11:37 )  x     / 0.30 ng/mL / x     / x     / x              TELEMETRY:     EKG: < from: 12 Lead ECG (21 @ 04:42) >  Ventricular Rate 71 BPM    Atrial Rate 71 BPM    P-R Interval 118 ms    QRS Duration 96 ms    Q-T Interval 402 ms    QTC Calculation(Bazett) 436 ms    P Axis 68 degrees    R Axis 40 degrees    T Axis -6 degrees    Diagnosis Line Normal sinus rhythm  Incomplete right bundle branch block  Borderline ECG    < end of copied text >        IMAGING:< from: Xray Chest 1 View- PORTABLE-Urgent (Xray Chest 1 View- PORTABLE-Urgent .) (21 @ 03:09) >    EXAM:  XR CHEST PORTABLE URGENT 1V            PROCEDURE DATE:  2021            INTERPRETATION:  Clinical History / Reason for exam: Cardiac arrest    Comparison : Chest radiograph 2016.    Technique/Positioning: Single AP view of the chest.    Findings:    Support devices: None.    Cardiac/mediastinum/hilum: Unremarkable.    Lung parenchyma/Pleura: No consolidation, effusion or pneumothorax.    Skeleton/soft tissues: No acute abnormality.    Impression:    No consolidation, effusion or pneumothorax.    < end of copied text >         PATIENT:  SELMA LOBO  [id number]    CHIEF COMPLAINT:  Patient is a 56y old  Female who presents with a chief complaint of Chest pain (2021 11:15)      OVERNIGHT EVENTS: Pt says she slept "ok" last night, didn't get much sleep. Says she still feels slightly lightheaded and "loopy" possibly as side effect from metoprolol. Pt also complains of increasing headaches. Denies chest pain, shortness of breath, nausea, vomiting, dizziness. No other complaints at this time.         MEDICATIONS:  MEDICATIONS  (STANDING):  aspirin  chewable 81 milliGRAM(s) Oral daily  atorvastatin 80 milliGRAM(s) Oral at bedtime  chlorhexidine 4% Liquid 1 Application(s) Topical daily  clopidogrel Tablet 75 milliGRAM(s) Oral daily  metoprolol succinate ER 25 milliGRAM(s) Oral daily  pantoprazole    Tablet 40 milliGRAM(s) Oral before breakfast  sodium chloride 0.9%. 1000 milliLiter(s) (100 mL/Hr) IV Continuous <Continuous>    MEDICATIONS  (PRN):  acetaminophen   Tablet .. 325 milliGRAM(s) Oral every 6 hours PRN Moderate Pain (4 - 6)      ALLERGIES:  Allergies    No Known Allergies    Intolerances        OBJECTIVE:  ICU Vital Signs Last 24 Hrs  T(C): 36.7 (2021 00:00), Max: 36.8 (2021 16:01)  T(F): 98 (2021 00:00), Max: 98.3 (2021 16:01)  HR: 66 (2021 02:00) (64 - 74)  BP: 116/71 (2021 02:00) (113/67 - 145/72)  BP(mean): 92 (2021 02:00) (82 - 110)  ABP: --  ABP(mean): --  RR: 18 (2021 02:00) (16 - 26)  SpO2: 100% (2021 02:00) (99% - 100%)      Adult Advanced Hemodynamics Last 24 Hrs  CVP(mm Hg): --  CVP(cm H2O): --  CO: --  CI: --  PA: --  PA(mean): --  PCWP: --  SVR: --  SVRI: --  PVR: --  PVRI: --  CAPILLARY BLOOD GLUCOSE        CAPILLARY BLOOD GLUCOSE        I&O's Summary    2021 07:01  -  2021 07:00  --------------------------------------------------------  IN: 1730 mL / OUT: 900 mL / NET: 830 mL    2021 07:01  -  2021 05:58  --------------------------------------------------------  IN: 1090 mL / OUT: 1350 mL / NET: -260 mL      Daily     Daily Weight in k.9 (2021 06:00)    PHYSICAL EXAMINATION:  General: Resting comfortably in bed. No acute distress  Respiratory: Lung sounds clear to auscultation bilaterally. No rales, rhonchi or wheezing  CV: Regular rate and rhythm. No murmurs, rubs or gallops    Incisions:   Tubes:    LABS:                          12.6   9.19  )-----------( 263      ( 2021 04:43 )             38.6         141  |  104  |  10  ----------------------------<  92  3.6   |  31  |  0.6<L>    Ca    8.7      2021 04:50  Mg     2.0     -          Troponin T, Serum: 0.30 ng/mL ( @ 11:37)    CARDIAC MARKERS ( 2021 11:37 )  x     / 0.30 ng/mL / x     / x     / x              TELEMETRY:  No more new episodes of NSVT    EKG: < from: 12 Lead ECG (21 @ 04:42) >  Ventricular Rate 71 BPM    Atrial Rate 71 BPM    P-R Interval 118 ms    QRS Duration 96 ms    Q-T Interval 402 ms    QTC Calculation(Bazett) 436 ms    P Axis 68 degrees    R Axis 40 degrees    T Axis -6 degrees    Diagnosis Line Normal sinus rhythm  Incomplete right bundle branch block  Borderline ECG    < end of copied text >        IMAGING:< from: Xray Chest 1 View- PORTABLE-Urgent (Xray Chest 1 View- PORTABLE-Urgent .) (21 @ 03:09) >    EXAM:  XR CHEST PORTABLE URGENT 1V            PROCEDURE DATE:  2021            INTERPRETATION:  Clinical History / Reason for exam: Cardiac arrest    Comparison : Chest radiograph 2016.    Technique/Positioning: Single AP view of the chest.    Findings:    Support devices: None.    Cardiac/mediastinum/hilum: Unremarkable.    Lung parenchyma/Pleura: No consolidation, effusion or pneumothorax.    Skeleton/soft tissues: No acute abnormality.    Impression:    No consolidation, effusion or pneumothorax.    < end of copied text >         PATIENT:  SELMA LOBO  [id number]    CHIEF COMPLAINT:  Patient is a 56y old  Female who presents with a chief complaint of Chest pain (2021 11:15)      OVERNIGHT EVENTS: Pt says she slept "ok" last night, didn't get much sleep. Says she still feels slightly lightheaded and "loopy" possibly as side effect from metoprolol. Pt also complains of  headaches originating from neck area. Denies chest pain, shortness of breath, nausea, vomiting, dizziness. No other complaints at this time.         MEDICATIONS:  MEDICATIONS  (STANDING):  aspirin  chewable 81 milliGRAM(s) Oral daily  atorvastatin 80 milliGRAM(s) Oral at bedtime  chlorhexidine 4% Liquid 1 Application(s) Topical daily  clopidogrel Tablet 75 milliGRAM(s) Oral daily  metoprolol succinate ER 25 milliGRAM(s) Oral daily  pantoprazole    Tablet 40 milliGRAM(s) Oral before breakfast  sodium chloride 0.9%. 1000 milliLiter(s) (100 mL/Hr) IV Continuous <Continuous>    MEDICATIONS  (PRN):  acetaminophen   Tablet .. 325 milliGRAM(s) Oral every 6 hours PRN Moderate Pain (4 - 6)      ALLERGIES:  Allergies    No Known Allergies    Intolerances        OBJECTIVE:  ICU Vital Signs Last 24 Hrs  T(C): 36.7 (2021 00:00), Max: 36.8 (2021 16:01)  T(F): 98 (2021 00:00), Max: 98.3 (2021 16:01)  HR: 66 (2021 02:00) (64 - 74)  BP: 116/71 (2021 02:00) (113/67 - 145/72)  BP(mean): 92 (2021 02:00) (82 - 110)  ABP: --  ABP(mean): --  RR: 18 (2021 02:00) (16 - 26)  SpO2: 100% (2021 02:00) (99% - 100%)      Adult Advanced Hemodynamics Last 24 Hrs  CVP(mm Hg): --  CVP(cm H2O): --  CO: --  CI: --  PA: --  PA(mean): --  PCWP: --  SVR: --  SVRI: --  PVR: --  PVRI: --  CAPILLARY BLOOD GLUCOSE        CAPILLARY BLOOD GLUCOSE        I&O's Summary    2021 07:01  -  2021 07:00  --------------------------------------------------------  IN: 1730 mL / OUT: 900 mL / NET: 830 mL    2021 07:01  -  2021 05:58  --------------------------------------------------------  IN: 1090 mL / OUT: 1350 mL / NET: -260 mL      Daily     Daily Weight in k.9 (2021 06:00)    PHYSICAL EXAMINATION:  General: Resting comfortably in bed. No acute distress  Respiratory: Lung sounds clear to auscultation bilaterally. No rales, rhonchi or wheezing  CV: Regular rate and rhythm. No murmurs, rubs or gallops  neuro: no focal signs, ambulating normally      Incisions:   Tubes:    LABS:                          12.6   9.19  )-----------( 263      ( 2021 04:43 )             38.6         141  |  104  |  10  ----------------------------<  92  3.6   |  31  |  0.6<L>    Ca    8.7      2021 04:50  Mg     2.0     -          Troponin T, Serum: 0.30 ng/mL ( @ 11:37)    CARDIAC MARKERS ( 2021 11:37 )  x     / 0.30 ng/mL / x     / x     / x              TELEMETRY:  No more new episodes of NSVT    EKG: < from: 12 Lead ECG (21 @ 04:42) >  Ventricular Rate 71 BPM    Atrial Rate 71 BPM    P-R Interval 118 ms    QRS Duration 96 ms    Q-T Interval 402 ms    QTC Calculation(Bazett) 436 ms    P Axis 68 degrees    R Axis 40 degrees    T Axis -6 degrees    Diagnosis Line Normal sinus rhythm  Incomplete right bundle branch block  Borderline ECG    < end of copied text >        IMAGING:< from: Xray Chest 1 View- PORTABLE-Urgent (Xray Chest 1 View- PORTABLE-Urgent .) (21 @ 03:09) >    EXAM:  XR CHEST PORTABLE URGENT 1V            PROCEDURE DATE:  2021            INTERPRETATION:  Clinical History / Reason for exam: Cardiac arrest    Comparison : Chest radiograph 2016.    Technique/Positioning: Single AP view of the chest.    Findings:    Support devices: None.    Cardiac/mediastinum/hilum: Unremarkable.    Lung parenchyma/Pleura: No consolidation, effusion or pneumothorax.    Skeleton/soft tissues: No acute abnormality.    Impression:    No consolidation, effusion or pneumothorax.    < end of copied text >

## 2021-07-08 NOTE — DISCHARGE NOTE PROVIDER - NSDCMRMEDTOKEN_GEN_ALL_CORE_FT
aspirin 81 mg oral tablet, chewable: 1 tab(s) orally once a day  atorvastatin 80 mg oral tablet: 1 tab(s) orally once a day (at bedtime)  clopidogrel 75 mg oral tablet: 1 tab(s) orally once a day  metoprolol succinate 25 mg oral capsule, extended release: 0.5 cap(s) orally once a day   potassium chloride 20 mEq oral powder for reconstitution: 1 each orally once a day

## 2021-07-08 NOTE — DISCHARGE NOTE PROVIDER - HOSPITAL COURSE
55 YO F with PMHx of hypokalemia (on home supplementation), active smoker (1/2 PPD x 30 yrs), works as a  (denies any history of chest pain on exertion) presented to the ED with chest pain for 1 hour after she was under emotional stress   She described the chest pain as retrosternal, radiating toward neck / L arm, 8/10 in intensity, associated with diaphoresis and mild nausea.   In the ED VS were unremarkable.   Initial EKG showing rounded T-wave in the inferior leads, questionable for ST-elevation with TWI in lead I, aVL, V1-V2. However no clear ST elevations. On repeat EKG, improvement of the inferior T-wave abnormalities, but now with anterolateral ST-segment depression.    Code STEMI was cancelled and patient was added on for early cardiac catheterization tomorrow in AM.    Patient underwent cardiac cath which showed the following:  LEFT HEART CATHETERIZATION  Left main: Mild disease  LAD: Mild disease  Diag: Mild disease  Left Circumflex: Minor disease  OM: Minor disease  Right Coronary Artery: 90% thrombotic lesion distal   RPDA: Mild disease  PLS: Mild disease  INTERVENTION  SPECIMEN REMOVED: Not applicable  IMPLANTS: HIGINIO to distal RCA  APPROPRIATE USE CRITERIA (AUC): 9  POST-OP DIAGNOSIS:  Significant 1V CAD; HIGINIO to distal RCA  Patient is now medically stable to be discharged home

## 2021-07-08 NOTE — DISCHARGE NOTE NURSING/CASE MANAGEMENT/SOCIAL WORK - NSDCFUADDAPPT_GEN_ALL_CORE_FT
Please follow up at [facility]  [address] [place] [id number] [phone]

## 2021-07-08 NOTE — DISCHARGE NOTE PROVIDER - NSDCCPCAREPLAN_GEN_ALL_CORE_FT
PRINCIPAL DISCHARGE DIAGNOSIS  Diagnosis: Chest pain  Assessment and Plan of Treatment: You were found to have chest pain due to a heart attack. You had a block in your right coronary artery which lead to decreased blood supply to the heart. You underwent cardiac catheterization where we placed a stent in your artery to open up the block. Please take your medications as prescribed and follow up with your cardiologist.

## 2021-07-20 DIAGNOSIS — I21.4 NON-ST ELEVATION (NSTEMI) MYOCARDIAL INFARCTION: ICD-10-CM

## 2021-07-20 DIAGNOSIS — E87.6 HYPOKALEMIA: ICD-10-CM

## 2021-07-20 DIAGNOSIS — I47.2 VENTRICULAR TACHYCARDIA: ICD-10-CM

## 2021-07-20 DIAGNOSIS — E83.42 HYPOMAGNESEMIA: ICD-10-CM

## 2021-07-20 DIAGNOSIS — F17.200 NICOTINE DEPENDENCE, UNSPECIFIED, UNCOMPLICATED: ICD-10-CM

## 2021-07-20 DIAGNOSIS — R07.9 CHEST PAIN, UNSPECIFIED: ICD-10-CM

## 2021-07-20 DIAGNOSIS — I95.9 HYPOTENSION, UNSPECIFIED: ICD-10-CM

## 2021-07-20 DIAGNOSIS — I25.10 ATHEROSCLEROTIC HEART DISEASE OF NATIVE CORONARY ARTERY WITHOUT ANGINA PECTORIS: ICD-10-CM
